# Patient Record
Sex: FEMALE | Employment: FULL TIME | URBAN - METROPOLITAN AREA
[De-identification: names, ages, dates, MRNs, and addresses within clinical notes are randomized per-mention and may not be internally consistent; named-entity substitution may affect disease eponyms.]

---

## 2017-05-19 ENCOUNTER — OFFICE VISIT (OUTPATIENT)
Dept: ONCOLOGY | Facility: CLINIC | Age: 27
End: 2017-05-19
Attending: GENETIC COUNSELOR, MS
Payer: COMMERCIAL

## 2017-05-19 ENCOUNTER — APPOINTMENT (OUTPATIENT)
Dept: LAB | Facility: CLINIC | Age: 27
End: 2017-05-19
Attending: GENETIC COUNSELOR, MS
Payer: COMMERCIAL

## 2017-05-19 DIAGNOSIS — Z80.42 FAMILY HISTORY OF PROSTATE CANCER: ICD-10-CM

## 2017-05-19 DIAGNOSIS — Z80.0 FAMILY HISTORY OF PANCREATIC CANCER: ICD-10-CM

## 2017-05-19 DIAGNOSIS — Z80.3 FAMILY HISTORY OF MALIGNANT NEOPLASM OF BREAST: Primary | ICD-10-CM

## 2017-05-19 DIAGNOSIS — Z80.3 FAMILY HISTORY OF MALIGNANT NEOPLASM OF BREAST: ICD-10-CM

## 2017-05-19 PROCEDURE — 96040 ZZH GENETIC COUNSELING, EACH 30 MINUTES: CPT | Mod: ZF | Performed by: GENETIC COUNSELOR, MS

## 2017-05-19 PROCEDURE — 36415 COLL VENOUS BLD VENIPUNCTURE: CPT

## 2017-05-19 NOTE — PATIENT INSTRUCTIONS
Breast expanded Panel  The Breast Expanded cancer panel is a genetic test which analyzes 18 genes known to be associated with an increased lifetime risk of breast and other cancers. For many of the genes, published medical guidelines exist for detection, prevention, risk reduction, and/or treatment.     Assessing Cancer Risk  Only about 5-10% of cancers are thought to be due to an inherited cancer susceptibility gene.    These families often have:    Several people with the same or related types of cancer    Cancers diagnosed at a young age (before age 50)    Individuals with more than one primary cancer    Multiple generations of the family affected with cancer    Some people may be candidates for genetic testing of more than one gene.  For these families, genetic testing using a multi-gene cancer panel may be offered.  These panels may test genes known to increase the risk for breast (and other) cancers: GILBERT, BRCA1, BRCA2, CDH1, CHEK2, PALB2, PTEN, and TP53.  The purpose of this handout is to serve as a brief summary of the high/moderate-risk breast cancer genes which have published clinical management guidelines for individuals who are found to carry a mutation.    BRCA1 and BRCA2-Hereditary Breast and Ovarian Cancer Syndrome (HBOC)  A single mutation in one of the copies of BRCA1 or BRCA2 increases the risk for breast and ovarian cancer, among others.  The risk for pancreatic cancer and melanoma may also be slightly increased in some families.  The tables below list the chance that someone with a BRCA mutation would develop cancer in his or her lifetime1,2,3,4.          Women   Men     General Population BRCA+    General Population BRCA+   Breast  12% 40-80%  Breast <1% ~7%   Ovarian  1-2% 10-40%  Prostate 16% 20%             A person s ethnic background is also important to consider, as individuals of Ashkenazi [employer] ancestry have a higher chance of having a BRCA gene mutation.  There are three BRCA  mutations that occur more frequently in this population.     Individuals who inherit two BRCA2 mutations--one from their mother and one from their father--have a condition called Fanconi Anemia.  This rare autosomal recessive condition is associated with short stature, developmental delay, bone marrow failure, and increased risk for childhood cancers.    TP53-Li-Fraumeni Syndrome (LFS)  LFS is a cancer predisposition syndrome. Individuals with LFS are at an increased risk for developing cancer at a young age. The general lifetime risk for development of cancer is 50% by age 30 and 90% by age 60.  LFS is caused by a mutation in the TP53 gene.  A single mutation in one of the copies of TP53 increases the risk for multiple cancers.     Core Cancers: Sarcomas, Breast, Brain, Lung, Leukemias/Lymphomas, Adrenocortical carcinomas  Other Cancers: Gastrointestinal, Thyroid, Skin, Genitourinary    PTEN-Cowden Syndrome  Cowden syndrome is a hereditary condition that increases the risk for breast, thyroid, endometrial, and kidney cancer.  Cowden syndrome is caused by a mutation in the PTEN gene.  A single mutation in one of the copies of PTEN causes Cowden syndrome and increases cancer risk.  The table below shows the chance that someone with a PTEN mutation would develop cancer in their lifetime5,6.  Other benign features seen in some individuals with Cowden syndrome include benign skin lesions (facial papules, keratoses, lipomas), learning disability, autism, thyroid nodules, colon polyps, and larger head size.      Lifetime Cancer Risk   Cancer Type General Population Cowden Syndrome   Breast  12% 25-50%*   Thyroid  1% 35%   Renal 1-2% 35%   Endometrial  2-3% 28%   Colon 5% 9%   Melanoma 2-3% >5%     *One recent study found breast cancer risk to be increased to 85%    CDH1-Hereditary Diffuse Gastric Cancer (HDGC)  Currently, one gene is known to cause hereditary diffuse gastric cancer: CDH1.  Individuals with HDGC are at  increased risk for diffuse gastric cancer and lobular breast cancer. Of people diagnosed with HDGC, 30-50% have a mutation in the CDH1 gene.  This suggests there are likely other genes that may cause HDGC that have not been identified yet.      Lifetime Cancer Risks    General Pop HDGC    Diffuse Gastric  <1% ~80%   Breast 12% 39-52%       Additional Genes Associated with Increased Breast Cancer Risk  PALB2  Mutations in PALB2 have been shown to increase the risk of breast cancer up to 33-58% in some families; where individuals fall within this risk range is dependent upon family history.9 PALB2 mutations have also been associated with increased risk for pancreatic cancer, although this risk has not been quantified yet.  Individuals who inherit two PALB2 mutations--one from their mother and one from their father--have a condition called Fanconi Anemia.  This rare autosomal recessive condition is associated with short stature, developmental delay, bone marrow failure, and increased risk for childhood cancers.    GILBERT  GILBERT is a moderate-risk breast cancer gene. Women who have a mutation in GILBERT can have between a 2-4 fold increased risk for breast cancer compared to the general population.10  GILBERT mutations have also been associated with increased risk for pancreatic cancer, however an estimate of this cancer risk is not well understood.11  Individuals who inherit two GILBERT mutations have a condition called ataxia-telangiectasia (AT).  This rare autosomal recessive condition affects the nervous system and immune system, and is associated with progressive cerebellar ataxia beginning in childhood.  Individuals with ataxia-telangiectasia often have a weakened immune system and have an increased risk for childhood cancers.         CHEK2   CHEK2 is a moderate-risk breast cancer gene.  Women who have a mutation in CHEK2 have around a 2-fold increased risk for breast cancer compared to the general population, and this risk may be  higher depending upon family history.12,13,14 Mutations in CHEK2 have also been shown to increase the risk of a number of other cancers, including colon and prostate, however these cancer risks are currently not well understood.      Note that the table at the end of the handout includes additional moderate breast cancer genes.         Inheritance   All of the genes reviewed above are inherited in an autosomal dominant pattern.  This means that if a parent has a mutation, each of his or her children will have a 50% chance of inheriting that same mutation.  Therefore, each child--male or female--would have a 50% chance of being at increased risk for developing cancer.        Image obtained from Genetics Home Reference, 2013     In rare cases, there can be mutations in both copies of these genes (one from each parent), leading to different autosomal recessive conditions.  Mutations in some genes can occur de jules, which means that a person s mutation occurred for the first time in them and was not inherited from a parent.  Now that they have the mutation, however, it can be passed on to future generations.     Genetic Testing  Genetic testing involves a blood test and will look for any harmful mutations within genes that are associated with increased cancer risk.  If possible, it is recommended that the person(s) who has had cancer be tested before other family members.  That person will give us the most useful information about whether or not a specific gene is associated with the cancer in the family.    Results  There are three possible results of genetic testing:    Positive--a harmful mutation was identified in one or more of the genes    Negative--no mutation was identified in any of the genes on this panel    Variant of unknown significance--a variation in one of the genes was identified, but it is unclear how this impacts cancer risk in the family      Advantages and Disadvantages  There are advantages and  disadvantages to genetic testing.  Advantages    May clarify your cancer risk    Can help you make medical decisions    May explain the cancers in your family    May give useful information to your family members (if you share your results)    Disadvantages    Possible negative emotional impact of learning about inherited cancer risk    Uncertainty in interpreting a negative test result in some situations    Possible genetic discrimination concerns (see below)    Genetic Information Nondiscrimination Act (PREETI)  PREETI is a federal law that protects individuals from health insurance or employment discrimination based on a genetic test result alone.  Although rare, there are currently no legal discrimination protections in terms of life insurance, long term care, or disability insurances.  Visit the National Human Respi Research [place] genome.gov/64783563 to learn more.    Reducing Cancer Risk  Each of the genes listed within this handout have nationally recognized cancer screening guidelines that would be recommended for individuals who test positive.  In addition to increased cancer screening, surgeries may be offered or recommended to reduce cancer risk.  Recommendations are based upon an individual s genetic test result as well as their personal and family history of cancer.    Questions to Think About Regarding Genetic Testing    What effect will the test result have on me and my relationship with my family members if I have an inherited gene mutation?  If I don t have a gene mutation?    Should I share my test results, and how will my family react to this news, which may also affect them?    Are my children ready to learn new information that may one day affect their own health?  Resources  FORCE: Facing Our Risk of Cancer Empowered facingourrisk.org   Bright Pink bebrightpink.org   Li-Fraumeni Syndrome Association lfsassociation.org   PTEN World [employer]   No stomach for cancer, Inc.  nostomachforcancer.org   Stomach cancer relief network scrnet.org   Collaborative Group of the Americas on Inherited Colorectal Cancer (CGA) cgaicc.com    Cancer Care cancercare.org   American Cancer Society (ACS) cancer.org   National Cancer [place] (NCI) cancer.gov     Cancer Risk Management Program [phone]-CANCER ([phone])  ? Shu Thakur, MS, [facility]  [phone]  ? Kateryna Caitlin, MS, [facility]  [phone]  ? Kerrie Aleman, MS, [facility]  [phone]  ? Samaria Fajardo, MS, [facility]  [phone]  ? Magaly Awan, MS, [facility]  [phone]    References  1. Tyrell Hood PDP, Sonja S, Marybeth COLLAZO, Carloz JE, Jong JL, Milly N, Pamela H, Leeanne O, Tierra A, Nadege B, Amparo P, Dimitry S, Keyur DM, Jacobo N, Reyes E, Meredith H, Yovany E, Osiel J, Risanwilson J, Aynely B, Tulinius H, Thorlacius S, Eerola H, Nevdarnelllinna H, Rita K, Leeanna OP. Average risks of breast and ovarian cancer associated with BRCA1 or BRCA2 mutations detected in case series unselected for family history: a combined analysis of 222 studies. Am J Hum Keren. 2003;72:1117-30.  2. Bibi N, Amina M, Holman G.  BRCA1 and BRCA2 Hereditary Breast and Ovarian Cancer. Gene Reviews online. 2013.  3. Dennis YC, Marlon S, Edis G, Guaman S. Breast cancer risk among male BRCA1 and BRCA2 mutation carriers. J Natl Cancer Inst. 2007;99:1811-4.  4. Landen BALL, Canelo I, Leo J, Noemy E, Chance ER, Junior F. Risk of breast cancer in male BRCA2 carriers. J Med Keren. 2010;47:710-1.  5. Sandro MH, Vane J, Ciaran J, Kasey MANRIQUE, Solomon MS, Eng C. Lifetime cancer risks in individuals with germline PTEN mutations. Clin Cancer Res. 2012;18:400-7.  6. Shane RIOJAS. Cowden Syndrome: A Critical Review of the Clinical Literature. J Keren . 2009:18:13-27.  7. National Comprehensive Cancer Network. Clinical practice guidelines in oncology, colorectal cancer screening. Available online (registration required). 2013.  8. National Cancer [place]. SEER Cancer  Stat Fact Sheets.  December 2013.  9. Deirdre JORDAN, et al. Breast-Cancer Risk in Families with Mutations in PALB2. NEJM. 2014; 371(6):497-506.  10. Gabriel CORNEJO, Mehran D, Antoinette S, Ivy P, Javed T, Lacey M, Barney B, Austin H, Angel R, Jason K, Savanah L, Landen DG, Keyur D, Ja DF, Orin MR, The Breast Cancer Susceptibility Collaboration (UK) & Amilcar N. GILBERT mutations that cause ataxia-telangiectasia are breast cancer susceptibility alleles. Nature Genetics. 2006;38:873-875  11. Eloy N , Anibal Y, Kalani J, Lawanda L, Cate GM , Carrie ML, Markus S, Pena AG, Syngal S, Ruslan ML, Mark J , Houston R, Rebecca SZ, Zoya JR, Debra VE, Altagracia M, Vogelstein B, Niecy N, Macho RH, Vinicius KW, and Petey AP. GILBERT mutations in patients with hereditary pancreatic cancer. Cancer Discover. 2012;2:41-46  12. CHEK2 Breast Cancer Case-Control Consortium. CHEK2*1100delC and susceptibility to breast cancer: A collaborative analysis involving 10,860 breast cancer cases and 9,065 controls from 10 studies. Am J Hum Keren, 74 (2004), pp. 4682-8512  13. Moris T, Kari S, Kellie K, et al. Spectrum of Mutations in BRCA1, BRCA2, CHEK2, and TP53 in Families at High Risk of Breast Cancer. JUAN,2006;295(12):0107-4679.   14. Tiffanie C, Damon FAY, Elaina A, et al. Risk of breast cancer in women with a CHEK2 mutation with and without a family history of breast cancer. JClin Oncol. 2011;29:9003-1173.    Breast expanded Panel    GENES (18) ASSOCIATED CANCER(S) ASSOCIATED CANCER SYNDROME(S)   GILBERT breast, pancreas    BARD1 breast, ovary    BRCA1 breast, ovary, pancreas, melanoma, prostate, male breast Hereditary breast /ovarian cancer syndrome (HBOC)   BRCA2 breast, ovary, pancreas, melanoma, prostate, male breast Hereditary breast /ovarian cancer syndrome (HBOC)   BRIP1 breast, ovary    CDH1 breast, stomach, colon Hereditary diffuse gastric cancer   CHEK2 breast, colon, prostate    MRE11A breast, ovary     MUTYH breast, colon MUTYH-associated polyposis (MAP)   NBN breast, ovary, prostate    NF1 breast, brain, peripheral nervous system Neurofibromatosis 1   PALB2 breast, pancreas    PTEN breast, thyroid, uterus, colon, kidney Cowden syndrome, Zytozdao-Lnnsu-Aryvrkbfa syndrome (BRRS), PTEN-related Proteus syndrome (PS), Proteus-like syndrome   RAD50 breast, ovary    RAD51C breast, ovary    RAD51D breast, ovary    STK11 breast, ovary, colon, pancreas, stomach, uterus, cervix Peutz-Jeghers syndrome   TP53 breast, bone, brain, soft tissues, adrenal cortex Li-Fraumeni syndrome           Turnaround Time  4 - 6 weeks    insurance coverage   Testing will be held until prior authorization is obtained. You will be contacted once prior authorization is completed.   About the [facility] ([facility]), [facility]  In collaborative effort with the [facility] and the [place] [facility]), the [facility] offers a full range of diagnostic testing services, with a strong focus on quality, accuracy, and timeliness.

## 2017-05-19 NOTE — LETTER
Cancer Risk Management  Program Locations    [facility]  [facility]  Mailing Address  Cancer Risk Management Program  [facility]  [address] [facility] [address]  [place] [address]    New patient appointments  [phone]  May 22, 2017    Arabella Farris  [address] [place] [facility] [address]      Dear Arabella,    It was a pleasure meeting with you at the [facility] on May 19, 2017. Here is a copy of the progress note from your recent genetic counseling visit to the Cancer Risk Management Program. If you have any additional questions, please feel free to call.    5/19/2017    Referring Provider: self-referred    Presenting Information:   I met with Arabella Farris today for genetic counseling at the Cancer Risk Management Program at the [facility] to discuss her family history of breast cancer.  She is here today to review this history, cancer screening recommendations, and available genetic testing options.    Personal History:  Arabella is a 26 year old female.  She does not have any personal history of cancer.      She had her first menstrual period at age 13, does not have biological children, and is premenopausal. Arabella has her ovaries, fallopian tubes and uterus in place, and she has had no ovarian cancer screening to date.  She reports using oral contraceptive pills for 10 years and that she has never used hormone replacement therapy.      Her most recent OB-GYN exam and Pap smear three years ago were normal.  She has annual clinical breast exams. She had one mammogram and ultrasound two years ago after self-palpating a mass, which were normal. She does not regularly do any other cancer screening at this time. Arabella reported no tobacco use and occasional alcohol use.    Family History: (Please  see scanned pedigree for detailed family history information)    One maternal first cousin is 48 and was diagnosed with breast cancer at age 40 and cervical cancer at age 41 (likely a site of metastasis). Treatment included surgery and possibly chemotherapy and radiation.    One maternal great aunt (through her grandmother) was diagnosed with breast cancer at age 45 and is currently 90; treatment is unknown.    Arabella's maternal great grandmother (through her grandmother) was diagnosed with and passed away from pancreatic cancer at age 79. Smoking and alcohol history are unknown.    One paternal aunt was diagnosed with and passed away from breast cancer at age 49; treatment is unknown.    One paternal aunt was diagnosed with breast cancer at age 65 and passed away at age 69; treatment is unknown.    Arabella's paternal grandmother was diagnosed with breast cancer at age 60 and passed away at age 67; treatment is unknown.    One paternal great aunt (through her grandmother) was diagnosed with breast cancer at an unknown age and is currently 80; treatment is unknown.    Arabella's paternal great grandfather (through her grandmother) was diagnosed with prostate cancer at age 75 and passed away at age 76.    Her maternal ethnicity is [place]. Her paternal ethnicity is [place] and [place]. There is no known Ashkenazi Congregational ancestry on either side of her family. There is no reported consanguinity.    Discussion:    Arabella's family history of breast cancer is suggestive of a hereditary cancer syndrome.    We reviewed the features of sporadic, familial, and hereditary cancers. In looking at Arabella's family history, it is possible that a cancer susceptibility gene is present as she has multiple maternal and paternal relatives diagnosed with related cancers (breast, pancreatic, and prostate) and several of these relatives were diagnosed under age 50. That being said, she also has multiple relatives that have never been  diagnosed with a cancer, including her parents and all of her maternal aunts and uncles.    We discussed the natural history and genetics of several hereditary breast cancer syndromes. A detailed handout regarding these syndromes and the information we discussed was provided to Arabella at the end of our appointment today and can be found in the after visit summary.  Topics included: inheritance pattern, cancer risks, cancer screening recommendations, and also risks, benefits and limitations of testing.    We reviewed that the most common cause of hereditary breast cancer is HBOC syndrome, which is caused by mutations in the BRCA1 and BRCA2 genes. Individuals with HBOC syndrome are at increased risk for several different cancers, including breast, ovarian, prostate, male breast cancer, melanoma, and pancreatic cancer.    Based on her personal and family history, Arabella meets current National Comprehensive Cancer Network (NCCN) criteria for genetic testing of BRCA1 and BRCA2.      We discussed that there are also other additional genes that could cause increased risk for the cancers in Arabella's family. For example, mutations in the GILBERT gene are associated with increased risk for breast and pancreatic cancer. As many of these genes present with overlapping features in a family, it would be reasonable for Arabella to consider panel genetic testing to analyze multiple genes at once.    We discussed that genetic testing for hereditary breast cancer susceptibility genes is typically most informative when it is first performed on a family member with a personal history of breast cancer. In this situation, we discussed that Arabella's paternal great aunt and maternal first cousin with breast cancer would be the best individuals to test first. Testing is available to Arabella, but with limitations. If Arabella pursues testing at this time and receives a negative result, this does not rule out the possibility of a hereditary  cancer syndrome in her and/or her family. Despite these limitations, Arabella expressed interest in proceeding with testing.  We reviewed genetic testing options for hereditary breast cancer: single gene testing (BRCA1 and BRCA2), guidelines-based high and moderate risk panel testing (Breast Actionable Panel, 11 genes) and expanded high and moderate risk panel testing (Breast Expanded Panel, 18 genes). Arabella expressed interest in learning as much information as possible from the testing. She opted for the Breast Expanded Panel.  Genetic testing is available for 18 genes associated with increased risk for breast cancer: Breast Expanded Panel  (GILBERT, BARD1, BRCA1, BRCA2, BRIP1, CDH1, CHEK2, MRE11A, MUTYH, NBN, NF1, PALB2, PTEN, RAD50, RAD51C, RAD51D, STK11 and TP53).  We discussed that some of the genes in the Breast Expanded Panel are associated with specific syndromes and published management guidelines: HBOC syndrome (BRCA1, BRCA2), Peutz-Jeghers syndrome (STK11), Hereditary Diffuse Gastric Cancer (CDH1), Cowden syndrome (PTEN), Li Fraumeni syndrome (TP53), MUTYH Associated Polyposis (MUTYH), and Neurofibromatosis type 1 (NF1).  The GILBERT, BRIP1, CHEK2, NBN, PALB2, RAD51C, and RAD51D genes are associated with moderate breast cancer risk and have published management guidelines for either breast and/or ovarian cancer risk management.    The remaining genes (BARD1, MRE11A, and RAD50) are associated with moderate breast cancer risk and may allow us to make medical recommendations when mutations are identified.    Arabella was provided with a brochure from the Cancer Risk Management Program explaining the Breast Expanded Panel testing. This information can be seen in the patient instruction tab.  Consent was obtained and genetic testing for the Breast Expanded Panel was sent to the Molecular Diagnostics Laboratory at the [facility]. Turn around time: 4-6 weeks after insurance pre-authorization is  obtained.    Medical Management: The information from genetic testing may determine additional cancer screening recommendations (i.e. mammogram and breast MRI, more frequent colonoscopies, annual dermatologic exams, etc.) as well as options for risk reducing surgeries (i.e. bilateral mastectomy, surgery to remove the ovaries and/or uterus, etc.) for Arabella and her relatives. These recommendations will be discussed in detail once genetic testing is completed.    Plan:  1) Today Arabella elected to proceed with genetic testing using the Breast Expanded Panel offered by the [facility].  2) This information should be available in 4-6 weeks after insurance pre-authorization is obtained.  3) Arabella will return to clinic to discuss the results.    Face to face time: 40 minutes    Kerrie Aleman MS, [facility]  Certified Genetic Counselor  Office: [phone]  Pager: [phone]

## 2017-05-19 NOTE — LETTER
5/19/2017       RE: Arabella Farris  [address] [facility] [place] [address]     Dear Colleague,    Thank you for referring your patient, Arabella Farris, to the [facility]. Please see a copy of my visit note below.    5/19/2017    Referring Provider: self-referred    Presenting Information:   I met with Arabella Farris today for genetic counseling at the Cancer Risk Management Program at the [facility] to discuss her family history of breast cancer.  She is here today to review this history, cancer screening recommendations, and available genetic testing options.    Personal History:  Arabella is a 26 year old female.  She does not have any personal history of cancer.      She had her first menstrual period at age 13, does not have biological children, and is premenopausal. Arabella has her ovaries, fallopian tubes and uterus in place, and she has had no ovarian cancer screening to date.  She reports using oral contraceptive pills for 10 years and that she has never used hormone replacement therapy.      Her most recent OB-GYN exam and Pap smear three years ago were normal.  She has annual clinical breast exams. She had one mammogram and ultrasound two years ago after self-palpating a mass, which were normal. She does not regularly do any other cancer screening at this time. Arabella reported no tobacco use and occasional alcohol use.    Family History: (Please see scanned pedigree for detailed family history information)    One maternal first cousin is 48 and was diagnosed with breast cancer at age 40 and cervical cancer at age 41 (likely a site of metastasis). Treatment included surgery and possibly chemotherapy and radiation.    One maternal great aunt (through her grandmother) was diagnosed with breast cancer at age 45 and is currently 90; treatment is unknown.    Arabella's maternal great grandmother (through her grandmother) was diagnosed with and passed away from pancreatic  cancer at age 79. Smoking and alcohol history are unknown.    One paternal aunt was diagnosed with and passed away from breast cancer at age 49; treatment is unknown.    One paternal aunt was diagnosed with breast cancer at age 65 and passed away at age 69; treatment is unknown.    Arabella's paternal grandmother was diagnosed with breast cancer at age 60 and passed away at age 67; treatment is unknown.    One paternal great aunt (through her grandmother) was diagnosed with breast cancer at an unknown age and is currently 80; treatment is unknown.    Arabella's paternal great grandfather (through her grandmother) was diagnosed with prostate cancer at age 75 and passed away at age 76.    Her maternal ethnicity is Occitan. Her paternal ethnicity is Occitan and [place]ian. There is no known Ashkenazi Latter day ancestry on either side of her family. There is no reported consanguinity.    Discussion:    Arabella's family history of breast cancer is suggestive of a hereditary cancer syndrome.    We reviewed the features of sporadic, familial, and hereditary cancers. In looking at Arabella's family history, it is possible that a cancer susceptibility gene is present as she has multiple maternal and paternal relatives diagnosed with related cancers (breast, pancreatic, and prostate) and several of these relatives were diagnosed under age 50. That being said, she also has multiple relatives that have never been diagnosed with a cancer, including her parents and all of her maternal aunts and uncles.    We discussed the natural history and genetics of several hereditary breast cancer syndromes. A detailed handout regarding these syndromes and the information we discussed was provided to Arabella at the end of our appointment today and can be found in the after visit summary.  Topics included: inheritance pattern, cancer risks, cancer screening recommendations, and also risks, benefits and limitations of testing.    We reviewed that the most  common cause of hereditary breast cancer is HBOC syndrome, which is caused by mutations in the BRCA1 and BRCA2 genes. Individuals with HBOC syndrome are at increased risk for several different cancers, including breast, ovarian, prostate, male breast cancer, melanoma, and pancreatic cancer.    Based on her personal and family history, Arabella meets current National Comprehensive Cancer Network (NCCN) criteria for genetic testing of BRCA1 and BRCA2.      We discussed that there are also other additional genes that could cause increased risk for the cancers in Arabella's family. For example, mutations in the GILBERT gene are associated with increased risk for breast and pancreatic cancer. As many of these genes present with overlapping features in a family, it would be reasonable for Arabella to consider panel genetic testing to analyze multiple genes at once.    We discussed that genetic testing for hereditary breast cancer susceptibility genes is typically most informative when it is first performed on a family member with a personal history of breast cancer. In this situation, we discussed that Arabella's paternal great aunt and maternal first cousin with breast cancer would be the best individuals to test first. Testing is available to Arabella, but with limitations. If Arabella pursues testing at this time and receives a negative result, this does not rule out the possibility of a hereditary cancer syndrome in her and/or her family. Despite these limitations, Arabella expressed interest in proceeding with testing.  We reviewed genetic testing options for hereditary breast cancer: single gene testing (BRCA1 and BRCA2), guidelines-based high and moderate risk panel testing (Breast Actionable Panel, 11 genes) and expanded high and moderate risk panel testing (Breast Expanded Panel, 18 genes). Arabella expressed interest in learning as much information as possible from the testing. She opted for the Breast Expanded  Panel.  Genetic testing is available for 18 genes associated with increased risk for breast cancer: Breast Expanded Panel  (GILBERT, BARD1, BRCA1, BRCA2, BRIP1, CDH1, CHEK2, MRE11A, MUTYH, NBN, NF1, PALB2, PTEN, RAD50, RAD51C, RAD51D, STK11 and TP53).  We discussed that some of the genes in the Breast Expanded Panel are associated with specific syndromes and published management guidelines: HBOC syndrome (BRCA1, BRCA2), Peutz-Jeghers syndrome (STK11), Hereditary Diffuse Gastric Cancer (CDH1), Cowden syndrome (PTEN), Li Fraumeni syndrome (TP53), MUTYH Associated Polyposis (MUTYH), and Neurofibromatosis type 1 (NF1).  The GILBERT, BRIP1, CHEK2, NBN, PALB2, RAD51C, and RAD51D genes are associated with moderate breast cancer risk and have published management guidelines for either breast and/or ovarian cancer risk management.    The remaining genes (BARD1, MRE11A, and RAD50) are associated with moderate breast cancer risk and may allow us to make medical recommendations when mutations are identified.    Arabella was provided with a brochure from the Cancer Risk Management Program explaining the Breast Expanded Panel testing. This information can be seen in the patient instruction tab.  Consent was obtained and genetic testing for the Breast Expanded Panel was sent to the Molecular Diagnostics Laboratory at the [facility]. Turn around time: 4-6 weeks after insurance pre-authorization is obtained.    Medical Management: The information from genetic testing may determine additional cancer screening recommendations (i.e. mammogram and breast MRI, more frequent colonoscopies, annual dermatologic exams, etc.) as well as options for risk reducing surgeries (i.e. bilateral mastectomy, surgery to remove the ovaries and/or uterus, etc.) for Arabella and her relatives. These recommendations will be discussed in detail once genetic testing is completed.    Plan:  1) Today Arabella elected to proceed with genetic testing  using the Breast Expanded Panel offered by the [facility] Diagnostics [facility].  2) This information should be available in 4-6 weeks after insurance pre-authorization is obtained.  3) Arabella will return to clinic to discuss the results.    Face to face time: 40 minutes    Kerrie Aleman MS, [facility]  Certified Genetic Counselor  Office: [phone]  Pager: [phone]

## 2017-05-19 NOTE — NURSING NOTE
Chief Complaint   Patient presents with     Lab Only     patient here for peripheral labs for genetic testing   labs drawn from right arm

## 2017-05-19 NOTE — PROGRESS NOTES
5/19/2017    Referring Provider: self-referred    Presenting Information:   I met with Arabella Farris today for genetic counseling at the Cancer Risk Management Program at the [facility] to discuss her family history of breast cancer.  She is here today to review this history, cancer screening recommendations, and available genetic testing options.    Personal History:  Arabella is a 26 year old female.  She does not have any personal history of cancer.      She had her first menstrual period at age 13, does not have biological children, and is premenopausal. Arabella has her ovaries, fallopian tubes and uterus in place, and she has had no ovarian cancer screening to date.  She reports using oral contraceptive pills for 10 years and that she has never used hormone replacement therapy.      Her most recent OB-GYN exam and Pap smear three years ago were normal.  She has annual clinical breast exams. She had one mammogram and ultrasound two years ago after self-palpating a mass, which were normal. She does not regularly do any other cancer screening at this time. Arabella reported no tobacco use and occasional alcohol use.    Family History: (Please see scanned pedigree for detailed family history information)    One maternal first cousin is 48 and was diagnosed with breast cancer at age 40 and cervical cancer at age 41 (likely a site of metastasis). Treatment included surgery and possibly chemotherapy and radiation.    One maternal great aunt (through her grandmother) was diagnosed with breast cancer at age 45 and is currently 90; treatment is unknown.    Arabella's maternal great grandmother (through her grandmother) was diagnosed with and passed away from pancreatic cancer at age 79. Smoking and alcohol history are unknown.    One paternal aunt was diagnosed with and passed away from breast cancer at age 49; treatment is unknown.    One paternal aunt was diagnosed with breast cancer at age 65 and passed away  at age 69; treatment is unknown.    Arabella's paternal grandmother was diagnosed with breast cancer at age 60 and passed away at age 67; treatment is unknown.    One paternal great aunt (through her grandmother) was diagnosed with breast cancer at an unknown age and is currently 80; treatment is unknown.    Arabella's paternal great grandfather (through her grandmother) was diagnosed with prostate cancer at age 75 and passed away at age 76.    Her maternal ethnicity is [place]. Her paternal ethnicity is [place] and Romansh. There is no known Ashkenazi Jew ancestry on either side of her family. There is no reported consanguinity.    Discussion:    Arabella's family history of breast cancer is suggestive of a hereditary cancer syndrome.    We reviewed the features of sporadic, familial, and hereditary cancers. In looking at Arabella's family history, it is possible that a cancer susceptibility gene is present as she has multiple maternal and paternal relatives diagnosed with related cancers (breast, pancreatic, and prostate) and several of these relatives were diagnosed under age 50. That being said, she also has multiple relatives that have never been diagnosed with a cancer, including her parents and all of her maternal aunts and uncles.    We discussed the natural history and genetics of several hereditary breast cancer syndromes. A detailed handout regarding these syndromes and the information we discussed was provided to Arabella at the end of our appointment today and can be found in the after visit summary.  Topics included: inheritance pattern, cancer risks, cancer screening recommendations, and also risks, benefits and limitations of testing.    We reviewed that the most common cause of hereditary breast cancer is HBOC syndrome, which is caused by mutations in the BRCA1 and BRCA2 genes. Individuals with HBOC syndrome are at increased risk for several different cancers, including breast, ovarian, prostate, male  breast cancer, melanoma, and pancreatic cancer.    Based on her personal and family history, Arabella meets current National Comprehensive Cancer Network (NCCN) criteria for genetic testing of BRCA1 and BRCA2.      We discussed that there are also other additional genes that could cause increased risk for the cancers in Arabella's family. For example, mutations in the GILBERT gene are associated with increased risk for breast and pancreatic cancer. As many of these genes present with overlapping features in a family, it would be reasonable for Arabella to consider panel genetic testing to analyze multiple genes at once.    We discussed that genetic testing for hereditary breast cancer susceptibility genes is typically most informative when it is first performed on a family member with a personal history of breast cancer. In this situation, we discussed that Arabella's paternal great aunt and maternal first cousin with breast cancer would be the best individuals to test first. Testing is available to Arabella, but with limitations. If Arabella pursues testing at this time and receives a negative result, this does not rule out the possibility of a hereditary cancer syndrome in her and/or her family. Despite these limitations, Arabella expressed interest in proceeding with testing.  We reviewed genetic testing options for hereditary breast cancer: single gene testing (BRCA1 and BRCA2), guidelines-based high and moderate risk panel testing (Breast Actionable Panel, 11 genes) and expanded high and moderate risk panel testing (Breast Expanded Panel, 18 genes). Arabella expressed interest in learning as much information as possible from the testing. She opted for the Breast Expanded Panel.  Genetic testing is available for 18 genes associated with increased risk for breast cancer: Breast Expanded Panel  (GILBERT, BARD1, BRCA1, BRCA2, BRIP1, CDH1, CHEK2, MRE11A, MUTYH, NBN, NF1, PALB2, PTEN, RAD50, RAD51C, RAD51D, STK11 and TP53).  We  discussed that some of the genes in the Breast Expanded Panel are associated with specific syndromes and published management guidelines: HBOC syndrome (BRCA1, BRCA2), Peutz-Jeghers syndrome (STK11), Hereditary Diffuse Gastric Cancer (CDH1), Cowden syndrome (PTEN), Li Fraumeni syndrome (TP53), MUTYH Associated Polyposis (MUTYH), and Neurofibromatosis type 1 (NF1).  The GILBERT, BRIP1, CHEK2, NBN, PALB2, RAD51C, and RAD51D genes are associated with moderate breast cancer risk and have published management guidelines for either breast and/or ovarian cancer risk management.    The remaining genes (BARD1, MRE11A, and RAD50) are associated with moderate breast cancer risk and may allow us to make medical recommendations when mutations are identified.    Arabella was provided with a brochure from the Cancer Risk Management Program explaining the Breast Expanded Panel testing. This information can be seen in the patient instruction tab.  Consent was obtained and genetic testing for the Breast Expanded Panel was sent to the Molecular Diagnostics Laboratory at the [facility]. Turn around time: 4-6 weeks after insurance pre-authorization is obtained.    Medical Management: The information from genetic testing may determine additional cancer screening recommendations (i.e. mammogram and breast MRI, more frequent colonoscopies, annual dermatologic exams, etc.) as well as options for risk reducing surgeries (i.e. bilateral mastectomy, surgery to remove the ovaries and/or uterus, etc.) for Arabella and her relatives. These recommendations will be discussed in detail once genetic testing is completed.    Plan:  1) Today Arabella elected to proceed with genetic testing using the Breast Expanded Panel offered by the [facility].  2) This information should be available in 4-6 weeks after insurance pre-authorization is obtained.  3) Arabella will return to clinic to  discuss the results.    Face to face time: 40 minutes    Kerrie Aleman MS, [facility]  Certified Genetic Counselor  Office: [phone]  Pager: [phone]

## 2017-05-25 LAB — COPATH REPORT: NORMAL

## 2017-06-30 ENCOUNTER — APPOINTMENT (OUTPATIENT)
Dept: LAB | Facility: CLINIC | Age: 27
End: 2017-06-30
Attending: COLON & RECTAL SURGERY
Payer: COMMERCIAL

## 2017-06-30 ENCOUNTER — TELEPHONE (OUTPATIENT)
Dept: ONCOLOGY | Facility: CLINIC | Age: 27
End: 2017-06-30

## 2017-06-30 DIAGNOSIS — Z80.42 FAMILY HISTORY OF PROSTATE CANCER: ICD-10-CM

## 2017-06-30 DIAGNOSIS — Z80.0 FAMILY HISTORY OF PANCREATIC CANCER: ICD-10-CM

## 2017-06-30 DIAGNOSIS — Z80.3 FAMILY HISTORY OF MALIGNANT NEOPLASM OF BREAST: Primary | ICD-10-CM

## 2017-06-30 PROCEDURE — 81405 MOPATH PROCEDURE LEVEL 6: CPT

## 2017-06-30 PROCEDURE — 81479 UNLISTED MOLECULAR PATHOLOGY: CPT | Mod: 91

## 2017-06-30 PROCEDURE — 81211 ZZHCL BRCA1/BRCA2 HERED GENE DEL/DUP: CPT

## 2017-06-30 PROCEDURE — 81408 MOPATH PROCEDURE LEVEL 9: CPT

## 2017-06-30 PROCEDURE — 81321 PTEN GENE FULL SEQUENCE: CPT

## 2017-06-30 PROCEDURE — 81479 UNLISTED MOLECULAR PATHOLOGY: CPT

## 2017-06-30 PROCEDURE — 81406 MOPATH PROCEDURE LEVEL 7: CPT | Mod: 91

## 2017-06-30 PROCEDURE — 81213 ZZHCL BRCA1/BRCA2 DEL/DUP UNCOMM: CPT

## 2017-06-30 PROCEDURE — 81406 MOPATH PROCEDURE LEVEL 7: CPT

## 2017-06-30 PROCEDURE — 81323 PTEN GENE DUP/DELET VARIANT: CPT

## 2017-06-30 NOTE — TELEPHONE ENCOUNTER
Notified Arabella that we have received prior authorization approval for genetic testing valid from 5/19/17-11/19/17. Authorization number is [id number]. Per the insurance company, Arabella has no deductible and is covered 100% for testing. Arabella expressed understanding and stated that she wants to proceed with testing. We will be in touch again when results are complete. Arabella had no further questions.    BLAIRE Stewart    Division of Genetics   [phone]

## 2017-07-13 LAB — COPATH REPORT: NORMAL

## 2017-08-07 ENCOUNTER — OFFICE VISIT (OUTPATIENT)
Dept: ONCOLOGY | Facility: CLINIC | Age: 27
End: 2017-08-07
Attending: GENETIC COUNSELOR, MS
Payer: COMMERCIAL

## 2017-08-07 DIAGNOSIS — Z80.3 FAMILY HISTORY OF MALIGNANT NEOPLASM OF BREAST: Primary | ICD-10-CM

## 2017-08-07 PROCEDURE — 40000072 ZZH STATISTIC GENETIC COUNSELING, < 16 MIN: Mod: ZF | Performed by: GENETIC COUNSELOR, MS

## 2017-08-07 NOTE — LETTER
"    Cancer Risk Management  Program Locations    [facility]  [facility]  Mailing Address  Cancer Risk Management Program  [facility]  [address]  [facility] [address]  [place] [address]    New patient appointments  [phone]  August 9, 2017    Arabella Farris  [address] [address]      Dear Arabella,    It was a pleasure meeting with you again at the [facility] on August 7, 2017. Here is a copy of the progress note from your recent genetic counseling visit to the Cancer Risk Management Program. If you have any additional questions, please feel free to call.    8/7/2017    Referring Provider: self-referred    Presenting Information:  Arabella Farris returned to the Cancer Risk Management Program at the [facility] to discuss her genetic testing results. Her blood was drawn on 5/19/2017. The Breast Expanded Panel was ordered from the [facility]. This testing was done because of her family history of breast cancer.    Genetic Testing Result: NEGATIVE  Arabella is negative for mutations in the GILBERT, BARD1, BRCA1, BRCA2, BRIP1, CDH1, CHEK2, MRE11A, MUTYH, NBN, NF1, PALB2, PTEN, RAD50, RAD51C, RAD51D, STK11, and TP53 genes by sequencing and deletion/duplication analysis. No mutations were found in any of the 18 genes analyzed.    She does not have an identifiable mutation associated with Hereditary Breast and Ovarian Cancer syndrome (BRCA1, BRCA2), Li Fraumeni syndrome (TP53), Hereditary Diffuse Gastric Cancer (CDH1), Cowden syndrome (PTEN), Peutz-Jeghers syndrome (STK11), Neurofibromatosis type 1 (NF1) or MUTYH Associated Polyposis (MAP).      A copy of the test report can be found in the Laboratory tab, dated 6/30/2017, and named \"Next generation " "sequencing\".     Interpretation:  We discussed several different interpretations of this negative test result.    1. One explanation may be that there is a different gene or combination of genes and environment that are associated with the cancers in Arabella's relatives. We reviewed that additional genetic testing may be available in the future that can identify a genetic/hereditary explanation for her family history. As such, she is encouraged to contact me annually to review any new genetic testing options that may be appropriate for her.    2. It is possible that another relative, such as her paternal aunts with breast cancer and/or maternal great aunt with breast cancer, did have a mutation in one of these 18 genes and Arabella did not inherit it. If that is the case, Arabella would not be expected to carry the same increased risk for cancer that her relatives with the mutation would be expected to carry.  3. There is also a small possibility that there is a mutation in one of these genes, and we could not find it with our current testing methods.       Screening:  Based on this negative test result, it is important for Arabella and her relatives to refer back to the family history for appropriate cancer screening.      Based on her personal and family history, Arabella has a 22.6% lifetime risk of developing breast cancer based on the MARINA 8 model. As such, Arabella meets current National Comprehensive Cancer Network (NCCN) guidelines for high risk breast screening. This includes annual breast MRI in addition to annual mammogram beginning at age 39. In addition, Arabella should be receiving clinical breast exams by her physician. We discussed that Arabella could participate in our Cancer Risk Management Program in which our clinical nurse specialist provides an individual screening plan and assists with medical management. She could also consider waiting until she is nearing age 39 to readdress this screening with her " medical providers. Arabella expressed interest in meeting with Tete to discuss this screening, as well as self breast exams and preventative lifestyle behaviors. A referral was made to see GABE Gaviria, for this service.    Other population cancer screening options, such as those recommended by the American Cancer Society and NCCN, are also appropriate for Arabella and her family. These screening recommendations may change if there are changes to Arabella's personal and/or family history. Final screening recommendations should be made by each individual's managing physician.      Inheritance:  We reviewed the autosomal dominant inheritance of mutations in these 18 genes. We discussed that if Arabella chooses to have children, she cannot pass on an identifiable mutation in these genes to her future children based on this test result. Mutations in these gene do not skip generations.      Additional Testing Considerations:  Although Arabella's genetic testing result was negative, other relatives may still carry a gene mutation associated with hereditary breast cancer. Genetic counseling is recommended for her paternal great aunt with breast cancer, maternal first cousin with breast cancer, and her maternal great aunt with breast cancer to discuss their genetic testing options. Arabella's father, mother, and brothers could also consider meeting with a genetic counselor. If these relatives do pursue genetic testing, she is encouraged to contact me so we may discuss the impact of their test results on Arabella.    Summary:  We do not have an explanation for Arabella's family history of breast cancer. Because of that, it is important that she continue with cancer screening based on her personal and family history as discussed above.    Genetic testing is rapidly advancing, and new cancer susceptibility genes will most likely be identified in the future. Therefore, I encouraged Arabella to contact me annually or if  there are changes in her personal or family history. This may change how we assess her cancer risk, screening, and the testing we would offer.    Plan:  1. I provided Arabella with a copy of her test results today, and a handout summarizing negative genetic test results (see after visit summary).  2. She plans to follow-up with her medical providers.  3. A referral will be placed for Arabella to meet with MARJAN Gaviria to discuss high risk breast screening.  4. She should contact me annually, or sooner if her family history changes.    If Arabella has any further questions, I encouraged her to contact me at [phone].    Time spent face to face: 15 minutes.    Kerrie Aleman MS, [facility]  Certified Genetic Counselor  Office: [phone]  Pager: [phone]

## 2017-08-07 NOTE — PROGRESS NOTES
"8/7/2017    Referring Provider: self-referred    Presenting Information:  Arabella Farris returned to the Cancer Risk Management Program at the [facility] to discuss her genetic testing results. Her blood was drawn on 5/19/2017. The Breast Expanded Panel was ordered from the [facility]. This testing was done because of her family history of breast cancer.    Genetic Testing Result: NEGATIVE  Arabella is negative for mutations in the GILBERT, BARD1, BRCA1, BRCA2, BRIP1, CDH1, CHEK2, MRE11A, MUTYH, NBN, NF1, PALB2, PTEN, RAD50, RAD51C, RAD51D, STK11, and TP53 genes by sequencing and deletion/duplication analysis. No mutations were found in any of the 18 genes analyzed.    She does not have an identifiable mutation associated with Hereditary Breast and Ovarian Cancer syndrome (BRCA1, BRCA2), Li Fraumeni syndrome (TP53), Hereditary Diffuse Gastric Cancer (CDH1), Cowden syndrome (PTEN), Peutz-Jeghers syndrome (STK11), Neurofibromatosis type 1 (NF1) or MUTYH Associated Polyposis (MAP).      A copy of the test report can be found in the Laboratory tab, dated 6/30/2017, and named \"Next generation sequencing\".     Interpretation:  We discussed several different interpretations of this negative test result.    1. One explanation may be that there is a different gene or combination of genes and environment that are associated with the cancers in Arabella's relatives. We reviewed that additional genetic testing may be available in the future that can identify a genetic/hereditary explanation for her family history. As such, she is encouraged to contact me annually to review any new genetic testing options that may be appropriate for her.    2. It is possible that another relative, such as her paternal aunts with breast cancer and/or maternal great aunt with breast cancer, did have a mutation in one of these 18 genes and Arabella did not inherit it. If that is the case, " Arabella would not be expected to carry the same increased risk for cancer that her relatives with the mutation would be expected to carry.  3. There is also a small possibility that there is a mutation in one of these genes, and we could not find it with our current testing methods.       Screening:  Based on this negative test result, it is important for Arabella and her relatives to refer back to the family history for appropriate cancer screening.      Based on her personal and family history, Arabella has a 22.6% lifetime risk of developing breast cancer based on the MARINA 8 model. As such, Arabella meets current National Comprehensive Cancer Network (NCCN) guidelines for high risk breast screening. This includes annual breast MRI in addition to annual mammogram beginning at age 39. In addition, Arabella should be receiving clinical breast exams by her physician. We discussed that Arabella could participate in our Cancer Risk Management Program in which our clinical nurse specialist provides an individual screening plan and assists with medical management. She could also consider waiting until she is nearing age 39 to readdress this screening with her medical providers. Arabella expressed interest in meeting with Tete to discuss this screening, as well as self breast exams and preventative lifestyle behaviors. A referral was made to see GABE Gaviria CNS, for this service.    Other population cancer screening options, such as those recommended by the American Cancer Society and NCCN, are also appropriate for Arabella and her family. These screening recommendations may change if there are changes to Arabella's personal and/or family history. Final screening recommendations should be made by each individual's managing physician.      Inheritance:  We reviewed the autosomal dominant inheritance of mutations in these 18 genes. We discussed that if Arabella chooses to have children, she cannot pass on an identifiable  mutation in these genes to her future children based on this test result. Mutations in these gene do not skip generations.      Additional Testing Considerations:  Although Arabella's genetic testing result was negative, other relatives may still carry a gene mutation associated with hereditary breast cancer. Genetic counseling is recommended for her paternal great aunt with breast cancer, maternal first cousin with breast cancer, and her maternal great aunt with breast cancer to discuss their genetic testing options. Arabella's father, mother, and brothers could also consider meeting with a genetic counselor. If these relatives do pursue genetic testing, she is encouraged to contact me so we may discuss the impact of their test results on Arabella.    Summary:  We do not have an explanation for Arabella's family history of breast cancer. Because of that, it is important that she continue with cancer screening based on her personal and family history as discussed above.    Genetic testing is rapidly advancing, and new cancer susceptibility genes will most likely be identified in the future. Therefore, I encouraged Arabella to contact me annually or if there are changes in her personal or family history. This may change how we assess her cancer risk, screening, and the testing we would offer.    Plan:  1. I provided Arabella with a copy of her test results today, and a handout summarizing negative genetic test results (see after visit summary).  2. She plans to follow-up with her medical providers.  3. A referral will be placed for Arabella to meet with MARJAN Gaviria to discuss high risk breast screening.  4. She should contact me annually, or sooner if her family history changes.    If Arabella has any further questions, I encouraged her to contact me at [phone].    Time spent face to face: 15 minutes.    Kerrie Aleman MS, [facility]  Certified Genetic Counselor  Office: [phone]  Pager: [phone]

## 2017-08-07 NOTE — PATIENT INSTRUCTIONS
Negative Genetic Test Result    Genetic Testing  You had a blood test that looked at the genetic information in one or more genes associated with increased cancer risk.  The testing looked for any harmful changes that would stop this particular gene from working like it should. If an individual does not have any harmful changes or variants of unknown significance found from their blood test, their genetic test result is reported as negative.       Results  The genetic test did not identify any pathogenic (harmful) changes in the genes that were tested. There are several possible explanations for a negative test result. Without knowing the gene mutation in your family, the cause of the cancer in you or your relatives is still unknown. Your genetic counselor can help interpret the result for you and your relatives. In this case, there are several reasons that may explain the negative test result:    There may be a gene mutation in the family that you did not inherit.     You may have a gene mutation in a different gene that was not included in the test, or has not yet been discovered.     The cancers in you or your family may be due to a combination of genetic factors and environment (multifactorial/familial).    The cancers in you or your family may be sporadic/random cancers.    There is very small chance that a mutation was not found by current testing methods.  As testing technology evolves over time, it may still be possible to identify a mutation in a gene that was not found on this test.    It is important to note which genes were included in your test. A list of these genes can be found on your test result.    Screening Recommendations  Due to this negative test result, cancer screening recommendations should be based on your personal and family history. This may include increased cancer screening for you and/or your family members. Your genetic counselor and health care provider can help make  appropriate recommendations.      Please call us if you have any questions or concerns.     Cancer Risk Management Program  [phone] ([phone])  ? Shu Thakur, MS, [facility]  [phone]  ? Kateryna Tucker, MS, [facility]  [phone]  ? Kerrie Aleman, MS, [facility]  [phone]  ? Samaria Fajardo, MS, [facility]  [phone]  ? Magaly Awan, MS, [facility]  [phone]

## 2017-08-07 NOTE — MR AVS SNAPSHOT
After Visit Summary   8/7/2017    Arabella Farris    MRN: [id number]           Patient Information     Date Of Birth          1990        Visit Information        Provider Department      8/7/2017 10:15 AM Kerrie Aleman GC;  2 [address] CONSULT [facility]        Care Instructions            Negative Genetic Test Result    Genetic Testing  You had a blood test that looked at the genetic information in one or more genes associated with increased cancer risk.  The testing looked for any harmful changes that would stop this particular gene from working like it should. If an individual does not have any harmful changes or variants of unknown significance found from their blood test, their genetic test result is reported as negative.       Results  The genetic test did not identify any pathogenic (harmful) changes in the genes that were tested. There are several possible explanations for a negative test result. Without knowing the gene mutation in your family, the cause of the cancer in you or your relatives is still unknown. Your genetic counselor can help interpret the result for you and your relatives. In this case, there are several reasons that may explain the negative test result:    There may be a gene mutation in the family that you did not inherit.     You may have a gene mutation in a different gene that was not included in the test, or has not yet been discovered.     The cancers in you or your family may be due to a combination of genetic factors and environment (multifactorial/familial).    The cancers in you or your family may be sporadic/random cancers.    There is very small chance that a mutation was not found by current testing methods.  As testing technology evolves over time, it may still be possible to identify a mutation in a gene that was not found on this test.    It is important to note which genes were included in your test. A list of these genes can be  found on your test result.    Screening Recommendations  Due to this negative test result, cancer screening recommendations should be based on your personal and family history. This may include increased cancer screening for you and/or your family members. Your genetic counselor and health care provider can help make appropriate recommendations.      Please call us if you have any questions or concerns.     Cancer Risk Management Program  [phone] ([phone])  ? Shu Thakur, MS, [facility]  [phone]  ? Kateryna Caitlin, MS, [facility]  [phone]  ? Kerrie Aleman, MS, [facility]  [phone]  ? Samaria Fajardo, MS, [facility]  [phone]  ? Magaly Awan, MS, [facility]  [phone]          Follow-ups after your visit        Your next 10 appointments already scheduled     Aug 07, 2017 10:15 AM CDT   (Arrive by 10:00 AM)   RETURN WITH ROOM with Kerrie Aleman GC,  2 116 CONSULT [facility] ([facility])    [address] [place] [id number] [phone]              Who to contact     If you have questions or need follow up information about today's clinic visit or your schedule please contact [facility] directly at [phone].  Normal or non-critical lab and imaging results will be communicated to you by DocTreehart, letter or phone within 4 business days after the clinic has received the results. If you do not hear from us within 7 days, please contact the clinic through DocTreehart or phone. If you have a critical or abnormal lab result, we will notify you by phone as soon as possible.  Submit refill requests through ArcMail or call your pharmacy and they will forward the refill request to us. Please allow 3 business days for your refill to be completed.          Additional Information About Your Visit        ArcMail Information     ArcMail gives you secure access to your electronic health record. If you see a primary care provider, you can also  send messages to your care team and make appointments. If you have questions, please call your primary care clinic.  If you do not have a primary care provider, please call [phone] and they will assist you.        Care EveryWhere ID     This is your Care EveryWhere ID. This could be used by other organizations to access your [place] medical records  [id number]         Blood Pressure from Last 3 Encounters:   03/23/16 130/86   01/20/16 130/73    Weight from Last 3 Encounters:   03/23/16 61.9 kg (136 lb 8 oz)   01/20/16 57.6 kg (127 lb)              Today, you had the following     No orders found for display       Primary Care Provider    No Pcp Confirmed       No address on file        Equal Access to Services     JULIO CESAR MARINELLI : Jossy Gambino, anita tejada, lillian montes de oca, rudy fuller . So [facility] [phone].    ATENCIÓN: Si habla español, tiene a fernandez disposición servicios gratuitos de asistencia lingüística. Llame al [phone].    We comply with applicable federal civil rights laws and [place] laws. We do not discriminate on the basis of race, color, national origin, age, disability sex, sexual orientation or gender identity.            Thank you!     Thank you for choosing [facility]  for your care. Our goal is always to provide you with excellent care. Hearing back from our patients is one way we can continue to improve our services. Please take a few minutes to complete the written survey that you may receive in the mail after your visit with us. Thank you!             Your Updated Medication List - Protect others around you: Learn how to safely use, store and throw away your medicines at www.disposemymeds.org.          This list is accurate as of: 8/7/17 10:02 AM.  Always use your most recent med list.                   Brand Name Dispense Instructions for use Diagnosis    UNKNOWN TO PATIENT      Birth Control

## 2017-08-07 NOTE — LETTER
"8/7/2017       RE: Arabella Farris  [address] [place] [address]     Dear Colleague,    Thank you for referring your patient, Arabella Farris, to the [facility]. Please see a copy of my visit note below.    8/7/2017    Referring Provider: self-referred    Presenting Information:  Arabella Farris returned to the Cancer Risk Management Program at the [facility] to discuss her genetic testing results. Her blood was drawn on 5/19/2017. The Breast Expanded Panel was ordered from the [facility]. This testing was done because of her family history of breast cancer.    Genetic Testing Result: NEGATIVE  Arabella is negative for mutations in the GILBERT, BARD1, BRCA1, BRCA2, BRIP1, CDH1, CHEK2, MRE11A, MUTYH, NBN, NF1, PALB2, PTEN, RAD50, RAD51C, RAD51D, STK11, and TP53 genes by sequencing and deletion/duplication analysis. No mutations were found in any of the 18 genes analyzed.    She does not have an identifiable mutation associated with Hereditary Breast and Ovarian Cancer syndrome (BRCA1, BRCA2), Li Fraumeni syndrome (TP53), Hereditary Diffuse Gastric Cancer (CDH1), Cowden syndrome (PTEN), Peutz-Jeghers syndrome (STK11), Neurofibromatosis type 1 (NF1) or MUTYH Associated Polyposis (MAP).      A copy of the test report can be found in the Laboratory tab, dated 6/30/2017, and named \"Next generation sequencing\".     Interpretation:  We discussed several different interpretations of this negative test result.    1. One explanation may be that there is a different gene or combination of genes and environment that are associated with the cancers in Arabella's relatives. We reviewed that additional genetic testing may be available in the future that can identify a genetic/hereditary explanation for her family history. As such, she is encouraged to contact me annually to review any new genetic testing options that may be appropriate " for her.    2. It is possible that another relative, such as her paternal aunts with breast cancer and/or maternal great aunt with breast cancer, did have a mutation in one of these 18 genes and Arabella did not inherit it. If that is the case, Arabella would not be expected to carry the same increased risk for cancer that her relatives with the mutation would be expected to carry.  3. There is also a small possibility that there is a mutation in one of these genes, and we could not find it with our current testing methods.       Screening:  Based on this negative test result, it is important for Arabella and her relatives to refer back to the family history for appropriate cancer screening.      Based on her personal and family history, Arabella has a 22.6% lifetime risk of developing breast cancer based on the MARINA 8 model. As such, Arabella meets current National Comprehensive Cancer Network (NCCN) guidelines for high risk breast screening. This includes annual breast MRI in addition to annual mammogram beginning at age 39. In addition, Arabella should be receiving clinical breast exams by her physician. We discussed that Arabella could participate in our Cancer Risk Management Program in which our clinical nurse specialist provides an individual screening plan and assists with medical management. She could also consider waiting until she is nearing age 39 to readdress this screening with her medical providers. Arabella expressed interest in meeting with Tete to discuss this screening, as well as self breast exams and preventative lifestyle behaviors. A referral was made to see GABE Gaviria CNS, for this service.    Other population cancer screening options, such as those recommended by the American Cancer Society and NCCN, are also appropriate for Arabella and her family. These screening recommendations may change if there are changes to Arabella's personal and/or family history. Final screening recommendations  should be made by each individual's managing physician.      Inheritance:  We reviewed the autosomal dominant inheritance of mutations in these 18 genes. We discussed that if Arabella chooses to have children, she cannot pass on an identifiable mutation in these genes to her future children based on this test result. Mutations in these gene do not skip generations.      Additional Testing Considerations:  Although Arabella's genetic testing result was negative, other relatives may still carry a gene mutation associated with hereditary breast cancer. Genetic counseling is recommended for her paternal great aunt with breast cancer, maternal first cousin with breast cancer, and her maternal great aunt with breast cancer to discuss their genetic testing options. Arabella's father, mother, and brothers could also consider meeting with a genetic counselor. If these relatives do pursue genetic testing, she is encouraged to contact me so we may discuss the impact of their test results on Arabella.    Summary:  We do not have an explanation for Arabella's family history of breast cancer. Because of that, it is important that she continue with cancer screening based on her personal and family history as discussed above.    Genetic testing is rapidly advancing, and new cancer susceptibility genes will most likely be identified in the future. Therefore, I encouraged Arabella to contact me annually or if there are changes in her personal or family history. This may change how we assess her cancer risk, screening, and the testing we would offer.    Plan:  1. I provided Arabella with a copy of her test results today, and a handout summarizing negative genetic test results (see after visit summary).  2. She plans to follow-up with her medical providers.  3. A referral will be placed for Arabella to meet with MARJAN Gaviria to discuss high risk breast screening.  4. She should contact me annually, or sooner if her family history  changes.    If Arabella has any further questions, I encouraged her to contact me at [phone].    Time spent face to face: 15 minutes.    Kerrie Aleman MS, [facility]  Certified Genetic Counselor  Office: [phone]  Pager: [phone]

## 2017-11-10 ENCOUNTER — ONCOLOGY VISIT (OUTPATIENT)
Dept: ONCOLOGY | Facility: CLINIC | Age: 27
End: 2017-11-10
Attending: CLINICAL NURSE SPECIALIST
Payer: COMMERCIAL

## 2017-11-10 VITALS
RESPIRATION RATE: 16 BRPM | TEMPERATURE: 97.7 F | DIASTOLIC BLOOD PRESSURE: 81 MMHG | HEART RATE: 65 BPM | BODY MASS INDEX: 19.47 KG/M2 | SYSTOLIC BLOOD PRESSURE: 127 MMHG | WEIGHT: 131.84 LBS | OXYGEN SATURATION: 97 %

## 2017-11-10 DIAGNOSIS — Z80.3 FAMILY HISTORY OF MALIGNANT NEOPLASM OF BREAST: ICD-10-CM

## 2017-11-10 PROCEDURE — 99212 OFFICE O/P EST SF 10 MIN: CPT | Mod: ZF

## 2017-11-10 PROCEDURE — 99204 OFFICE O/P NEW MOD 45 MIN: CPT | Mod: ZP | Performed by: CLINICAL NURSE SPECIALIST

## 2017-11-10 RX ORDER — MULTIPLE VITAMINS W/ MINERALS TAB 9MG-400MCG
1 TAB ORAL DAILY
COMMUNITY

## 2017-11-10 ASSESSMENT — PAIN SCALES - GENERAL: PAINLEVEL: NO PAIN (0)

## 2017-11-10 NOTE — LETTER
Cancer Risk Management  Program Locations    [facility]  [facility]  [facility]  Mailing Address  Cancer Risk Management Program  [facility]  [address]  [place] [address]    New patient appointments  [phone]  November 10, 2017    Arabella Farris  [address]  [facility] [address]      Dear Arabella,    It was a pleasure meeting with you today.  Below is a copy of my note from our visit, outlining your surveillance plan.      I look forward to seeing you in the future to coordinate your care and reduce your health risks. Please feel free to contact me if you have any questions or concerns.      Oncology Risk Management Consultation:  Date on this visit: 11/10/2017    Arabella Farris is referred by Kerrie Aleman, Certified Genetic Counselor,  for an oncology risk management consultation. She requires evaluation for her risk of cancer secondary to having a family history of breast cancer in a maternal first cousin at 40, maternal great aunt at 45, paternal aunt at 49, paternal grandmother at 60 and several other relatives.. She is considered to at high risk for breast cancer and has a 22.6% lifetime risk for breast cancer by the MARINA model.     Primary Physician: No primary care provider on file.     History Of Present Illness:  Ms. Farris is a very pleasant, healthy 27 year old female who presents with family history of breast cancer.    Genetic testin2017 - NEGATIVE for genetic mutations in 18 genes associated with breast cancer including GILBERT, BARD1, BRCA1, BRCA2, BRIP1, CDH1, CHEK2, MRE11A, MUTYH, NBN, NF1, PALB2, PTEN, RAD50, RAD51C, RAD51D, STK11, and TP53 genes by sequencing and deletion/duplication analysis. The testing was done using a Breast Expanded panel through the [facility].    Pertinent history:  Menarche at 13.  Nulliparous.  Premenopausal.  Ovaries, fallopian tubes and uterus in place.  Hx of OCPs x10 years.  No hx of HRT    Pertinent screening history:  2015 - Left breast Mammogram  And L breast ultrasound for a palpable lump she identified. Category 1, negative. Dense breast tissue noted.  [facility] [place] [place]     At this visit,  she reports feeling well; she denies fatigue, pain, new breast asymmetry, nipple discharge, nipple inversion and changes in skin and shape of her breasts.     Past Medical/Surgical History:  Past Medical History:   Diagnosis Date     Lyme disease      Past Surgical History:   Procedure Laterality Date     CHOLECYSTOSTOMY       HC TOOTH EXTRACTION W/FORCEP         Allergies:  Allergies as of 11/10/2017     (No Known Allergies)       Current Medications:  Current Outpatient Prescriptions   Medication Sig Dispense Refill     Omega-3 Fatty Acids (FISH OIL CONCENTRATE PO)        vitamin B complex with vitamin C (VITAMIN  B COMPLEX) TABS tablet Take 1 tablet by mouth daily       multivitamin, therapeutic with minerals (MULTI-VITAMIN) TABS tablet Take 1 tablet by mouth daily       Probiotic Product (PROBIOTIC ADVANCED PO)           Family History:  Family History   Problem Relation Age of Onset     Breast Cancer Paternal Grandmother 60      at 67     Breast Cancer Cousin 40     maternal first cousin     Cervical Cancer Cousin 41     Breast Cancer Maternal Aunt 45     maternal great aunt     Pancreatic Cancer Other 79     maternal great granmother     Breast Cancer Paternal Aunt 49      at 49     Breast Cancer Paternal Aunt 65      at 69     Breast Cancer Other      paternal great aunt     Prostate Cancer Other 75     paternal great grandfather;  at 76     Autoimmune Disease No family hx of        Social History:  Social History     Social History     Marital status:  Single     Spouse name: N/A     Number of children: 0     Years of education: N/A     Occupational History      Student     [employer]     Social History Main Topics     Smoking status: Never Smoker     Smokeless tobacco: Never Used     Alcohol use 0.6 oz/week     1 Standard drinks or equivalent per week     Drug use: No     Sexual activity: Yes     Partners: Male     Birth control/ protection: Condom     Other Topics Concern     Not on file     Social History Narrative     Review of Systems:  GENERAL: No change in weight, sleep or appetite.  Normal energy.  No fever or chills  EYES: Negative for vision changes or eye problems  ENT: No problems with ears, nose or throat.  No difficulty swallowing.  RESP: No coughing, wheezing or shortness of breath  CV: No chest pains or palpitations  GI: No nausea, vomiting,  heartburn, abdominal pain, diarrhea, constipation or change in bowel habits  : No urinary frequency or dysuria, bladder or kidney problems  MUSCULOSKELETAL: No significant muscle or joint pains  NEUROLOGIC: No headaches, numbness, tingling, weakness, problems with balance or coordination  PSYCHIATRIC: No problems with anxiety, depression or mental health  HEME/IMMUNE/ALLERGY: No history of bleeding or clotting problems or anemia.  No allergies or immune system problems  ENDOCRINE: No history of thyroid disease, diabetes or other endocrine disorders  SKIN: No rashes,worrisome lesions or skin problems    Physical Exam:  /81  Pulse 65  Temp 97.7  F (36.5  C) (Oral)  Resp 16  Wt 59.8 kg (131 lb 13.4 oz)  SpO2 97%  BMI 19.47 kg/m2         GENERAL APPEARANCE: healthy, alert and no apparent distress  NECK: no adenopathy, no asymmetry or masses     LYMPHATICS: No cervical, supraclavicular or axillary lymphadenopathy     RESP: lungs clear to auscultation - no rales, rhonchi or wheezes     BREAST: A multipositional, bilateral breast exam was performed.  Fairly symmetrical -Lsl>R. Right breast: no palpable  dominant masses, no nipple discharge, no skin changes. Dense tissue.  Right axilla: no palpable adenopathy. Left breast: no palpable dominant masses, raised areas of likely thickened cartilage an inferior portion of breast, adjacent to ribcage, no nipple discharge, no skin changes. Left axilla: no palpable adenopathy. Dense tissue.   CARDIOVASCULAR: regular rate and rhythm, normal S1 S2, no S3 or S4 and no murmur.        SKIN: no suspicious lesions or rashes  Laboratory/Imaging Studies  Results for orders placed or performed in visit on 06/30/17   Next Generation Sequencing   Result Value Ref Range    Copath Report       Patient Name: ELADIA PORTER  MR#: [id number]  Specimen #: [id number]  Collected: 6/30/2017 13:16  Received: 7/9/2017 20:20  Reported: 7/13/2017 17:37  Ordering Phy(s): AGNIESZKA RHOADES    For improved result formatting, select 'View Enhanced Report Format'  under Linked Documents section.  _________________________________________    TEST(S) REQUESTED:  Next Generation Sequencing    SPECIMEN DESCRIPTION:  Blood  Collected 05/19/2017    CLINICAL COMMENTS:  Patient has no personal history of breast cancer, but has a significant  family history of early onset breast cancer.    TEST REQUESTED: Breast cancer expanded panel. Next generation sequencing  and copy number variation analysis of: GILBERT, BARD1, BRCA1, BRCA2, BRIP1,  CDH1, CHEK2, MRE11A, MUTYH, NBN, NF1, PALB2, PTEN, RAD50, RAD51C,  RAD51D, STK11, TP53.    RESULTS:          NEGATIVE                    Pathogenic Variant(s):                      None  Detected                  Variant(s) of Uncertain Significance:       None  Detected     INTERPRETATION:  No pathogenic sequence variants or clinically significant copy number  variants were detected in the genes analyzed. Therefore, this testing  did not identify a highly penetrant, single gene hereditary risk for  breast cancer in this patient. These results should be interpreted with  caution in the  context of this patient's significant family history of  cancer.  Genetic counseling regarding these results is recommended.    BACKGROUND:  Breast cancer is a common form of cancer most often resulting from  complex interaction of multiple genetic and environmental factors.  In a  subset of cases, typically characterized by young age of onset and/or  multiple affected family members, a hereditary factor may be identified.   Mutations in the BRCA1 and BRCA2 genes are the most common finding in  families with hereditary breast cancer.  Mutations in other genes have  been identified in a smaller number of families.  The majority of  hereditary breast cancer mutations are i nherited as autosomal dominant  risk factors and the risk for developing breast cancer varies depending  upon the specific gene and mutation identified.  Normal genetic testing  results do not completely exclude a risk for hereditary breast cancer  and results should be interpreted in the context of the patient's  clinical and family history.    COVERAGE:  This analysis is limited to the coding exons and immediately adjoining  intronic sequences of the analyzed genes.  Unless specifically indicated  below, all analyzed coding exonic bases have either a minimum of 20x  coverage or have been analyzed by Madi sequencing.  Coverage at 20x is  not guaranteed for intronic positions. Therefore, intronic variants  cannot be confirmed or excluded with the same degree of confidence as  coding exonic variants.  Sequences that reside more than 25 base pairs  from a coding exon are not genotyped and mutations in these regions will  not be detected by this analysis.    Any coding bases that are co sukhi at less than 20x but greater than 15x  are specifically indicated below.  The sensitivity for detection of  heterozygous positions in these areas is reduced to 98.8-99.9% (assuming  the distribution of variant/reference calls is between 30%/70%  "and  70%/30%).    Gene     Coordinates     % of bases with >20x coverage  BARD1     chr2:[id number]     96.0  NBN     chr8:[id number]     99.5  NF1     chr17:[id number]     92.4  NF1     chr17:[id number]     96.7  NF1     chr17:[id number]     99.3    METHODOLOGY [Kirk et al., 2015][Alpesh et al., 2014]:  Genomic DNA is extracted from the sample, and sequencing libraries are  prepared according to standard Illumina protocols utilizing the TruSight  One Sequencing Panel for enrichment of targeted genes.  The enriched DNA  libraries are sequenced on an Illumina HiSeq 2500 instrument.  Raw  sequencing reads are mapped to the reference genome using BWA. Raw  alignment files are realigned in the neighborhood  of indels, and  recalibrated for base quality accuracy using the Genome Analysis Tool  Kit (GATK). Point mutation and indel calls in exons and adjoining  intronic regions are made using the GATK Unified Genotyper.  Variants  are interpreted according to guidance issued by the American College of  Medical Genetics [Nanette et al.2015].    Pathogenic and predicted pathogenic variants that meet ALL of the  following criteria are reported without validation by Madi sequencin- single nucleotide substitution, 2- receives a \"PASS\" from the  variant recalibrator, 3- has a QUAL score of >300, 4- has a VAF in the  accepted range (heterozygous = 0.3-0.6, homozygous/hemizygous >0.9), and  5- has a minimum of 20x coverage.  Pathogenic variants that fail to meet  any of these criteria are verified by [place] sequencing prior to  reporting [Wu et al., 2015].    For copy number variation (CNV) analysis, raw sequencing reads are  mapped to the reference human genome (HG19) using both BWA a nd Bowtie  algorithms.  CNV ratios are computed by comparing the average coverage  in the sample across 60 base pair windows.  Average coverage is compared  to control loci both within the sample " and within a gender-matched  control sample from the same run.  The BWA/Bowtie coverage ratio is  calculated for each window in order to identify regions where the  presence of homologous sequences precludes accurate CNV calls.  Heterozygous deletion calls are made when 3 consecutive windows have a  CNV ratio of 0.3-0.7; homozygous/hemizygous deletion calls are made when  3 consecutive windows have a CNV ratio <0.3;duplication calls are made  when 5 consecutive windows have a CNV ratio >1.4.  Calls are only made  in areas where the BWA/Bowtie ratio is 0.9-1.1 and the average coverage  is 20x.  All CNV calls are confirmed with a supplementary qPCR assay.    ADDITIONAL VARIANTS IDENTIFIED:  The following types of variants are not included in the clinical report,  but information about these vari ants is available upon request:    * Missense variants that are present at >1% MAF in population databases  AND are not reported as pathogenic/likely pathogenic in ClinVar.  * Missense variants with a MAF <1%, that are classified as benign or  likely benign according to published ACMG criteria.  * Synonymous variants that do not occur at intron/exon boundaries, are  not reported as pathogenic mutations in relevant clinical databases, and  are present in 15 or more individuals in ExAC.  * Intronic variants that reside more than 5 bases from the exon/intron  boundary AND are not reported as pathogenic/likely pathogenic in  ClinVar.  Known pathogenic variants residing 5-25bp from an intron/exon  boundary will be reported.  * Untranslated region (UTR) variants not previously categorized as  pathogenic or likely pathogenic in relevant clinical databases.  * Some variants may be excluded based on review of sequencing quality  data.  It is possible that some low quality variants are, in fact,  real.    LIMITATIONS: This analysis has not been validated for detection of  insertion/deletion mutations larger than 18bp in length. In  addition,  this analysis will not detect large structural variations, such as whole  gene deletions. The size of polymorphic repetitive sequences such as CAG  repeats, intronic dinucleotide repeats, or intronic polyT/polyA  sequences, cannot be determined by this analysis.    The CNV algorithm is not validated to detect deletions encompassing less  than 180 base pairs of coding sequence or duplications encompassing less  than 300 base pairs of coding sequence.  The CNV algorithm does not  define precise breakpoints for duplications or deletions.    REFERENCES:  Wu KHAN, Santiago BILLS, Bebeto SLATER, Marc URIAS, Alpesh UMAÑA, et al. 2015.  Criteria for Clinical Reporting of Variants from a Broad Target Capture  NGS Assay without Maid Verification. JSM biomarkers 2(1):1005.    Alpesh UMAÑA, Santiago Fletcher Henzler C, Vale BILLS, Marin JIMENEZ,  Thyagararhonda B.  2016. CNV-RF Is a Random [place]-Based Copy Number  Variation Detection Method Using Next-Generation Sequencing. J Mol  Diagn. 18(6):872-881.    Alpesh UMAÑA, Artemio KHOURY, Radha DUNBAR, Ruth KHOURY, Abdiel MCCORMICK, Mouna A, Kirk S,  Vale BILLS, Santiago BILLS, Marin JIMENEZ, Thyagarajan B. 2014.  Implementation of Cloud based next generation sequencing data analysis  in a clinical laboratory. BMC Res Notes. 7:314.    Nanette CS, Jun S, Juan DB, Taylor S, Chuyita WW, Calos MR, Karan E,  Abdifatah BE, Molecular Subcommittee of the ACMG Laboratory   Committee. 2008. ACMG recommendations for standards for interpretation  and reporting of sequence variations: Revisions 2007. Keren. Med.  10(4):294-300.    Sammy JA, Ramya AW, O'Doni TD, Omer W, Orestes EE, Ronaldo S, Laboy S,  Do R, Tracy X, Tyson G, Jaison HM, Branden D, Ethel SM, Cole S, Nazanin MJ,  Carlos G, Jagjit D, Javon DA, Jm E, Sunlatasha S,  Travon CD, Kelvin ONEILL, Isaias JM, Broad GO, [place] GO, [facility] Exome  Sequencing Pro ject. 2012. Evolution and functional impact of rare coding  variation from deep  sequencing of human exomes. Science. 337(5826):64-9.    Kirk S, Mouna A, Patience K, Bharti T, Santiago M, Vale M, Alpesh G,  Robert J, Artemio J, Luis Fernando Y, Flores CORNEJO, Radha DUNBAR, Abdiel CHADWICK,  Marin JIMENEZ, Cassie SEYMOUR. 2015. Clinical validation of targeted  next-generation sequencing for inherited disorders. Arch. Pathol. Lab.  Med. 139(2):204-10.    If a patient is the recipient of an allogeneic bone marrow transplant,  this test must be done on a pre-transplant sample or buccal swab.  A  previous allogeneic bone marrow transplant will interfere with test  results.  Call the [facility]([phone]) for  instructions on sample collection for these patients.    This test was developed and its performance characteristics determined  by the [facility],  Molecular Diagnostics  Laboratory and the [facility](Cancer &  Cardiovascular Rese arch Building Room [address], [address] [place] [address]).   Genomic DNA extraction, interpretation of  sequencing data, and when necessary, Madi sequencing is performed at  [facility],  Molecular Diagnostics  Laboratory.   Wet bench processes including library creation, sequence  capture using an Illumina Snapsheet 2500 analyzer and bioinformatics is  performed at the [facility] [place].  It has not  been cleared or approved by the FDA. The laboratory is regulated under  CLIA as qualified to perform high-complexity testing. This test is used  for clinical purposes. It should not be regarded as investigational or  for research.    A resident/fellow in an accredited training program was involved in the  selection of testing, review of laboratory data, and/or interpretation  of this case.  I, as the senior physician, attest that I: (i) confirmed  appropriate testing, (ii) examined the relevant raw data for the  specimen(s); and (ii i) rendered or confirmed  the interpretation(s).    Electronically Signed Out By:  Adam Washburn M.D., [facility]    CPT Codes:    TESTING LAB LOCATION:  [facility]  [address] [place], [facility] [address]  [address] [place] [id number] [phone]    COLLECTION SITE:  Client:  General acute hospital  [facility]  [facility]         ASSESSMENT  We discussed because the youngest person with breast cancer in her family was 40, it would be reasonable to start screening between 30-39; she is interested in starting when she is 30 and will contact me again at that time.  We discussed breast health, breast concerns and signs and symptoms to be watchful for. She practiced palpating with the clinic's breast simulation model and verbalized understanding of signs and symptoms to address with her health care providers including lumps, thickening, swelling, tenderness, nipple discharge or changes in skin of breasts.    We also reviewed the  healthy lifestyle plan recommended by both NCCN and the American Cancer Society that can reduce the risk of breast cancer:  1 Limit alcohol consumption to less than 1 drink per day (1 drink=5 oz.wine, 12 oz. Beer or 1.5 oz. 80-proof liquor).  2. Exercise per American Cancer Society guidelines of at least 150 minutes of moderate-intensity activity or 75 minutes of vigorous activity each week. (Or a combination of both.) Exercise should be spread  out over the week.  3. Maintain a healthy weight with a Body Mass Index between 19-24.9.  4. Do not use tobacco products and limit exposure to passive smoke.  5. Breastfeed if possible. Research shows that 16+ months of breastfeeding, over a lifetime, can reduce a woman's risk of breast cancer by up to 25%.    Individualized Surveillance Plan for women  With 20% or greater lifetime risk of breast cancer   Per NCCN Breast Cancer Screening and Diagnosis Guidelines Version 1.2017    Recommended screening Test or  procedure Last done Next Scheduled    Clinical encounter Ongoing risk assessment, risk reduction counseling and clinical breast exam, every 6-12 months. 11/10/2017 Ongoing   However, some family histories with breast cancers at a very young age, may warrant screening starting earlier.    *May begin at age 40 if breast cancers in the family occur at later ages.    Annual mammogram beginning 10 years younger than the earliest breast cancer in the family but not prior to age 30.    Recommend annual breast MRI to begin 10 years younger than the earliest breast cancer in the family but not prior to age 25.    Breast MRIs are preferably done on day 7-15 of the menstrual cycle in premenopausal women. May begin at age 30 with baseline mammogram    Breast screening for patients at high risk due to thoracic radiation between the ages of 10-30     Begin 8-10 years post radiation treatment or age 25.   Annual mammogram   and  Annual breast MRI, preferably on day 7-15 of menstrual cycle for premenopausal women.    Annual clinical breast exams with ongoing risk assessment and risk reduction counseling until age 25, then every 6-12 months.   NA     NA       I spent 45 minutes with the patient with greater that 50% of it in counseling and coordinating care as documented above.    Tete Jarrell, GABE-CNS, OCN, ANG-BC  Clinical Nurse Specialist  Cancer Risk Management Program  [address]  [place] [address]    phone:  [phone]  Pager: [phone]  fax: [phone]

## 2017-11-10 NOTE — PROGRESS NOTES
Oncology Risk Management Consultation:  Date on this visit: 11/10/2017    Arabella Farris is referred by Kerrie Aleman, Certified Genetic Counselor,  for an oncology risk management consultation. She requires evaluation for her risk of cancer secondary to having a family history of breast cancer in a maternal first cousin at 40, maternal great aunt at 45, paternal aunt at 49, paternal grandmother at 60 and several other relatives.. She is considered to at high risk for breast cancer and has a 22.6% lifetime risk for breast cancer by the MARINA model.     Primary Physician: No primary care provider on file.     History Of Present Illness:  Ms. Farris is a very pleasant, healthy 27 year old female who presents with family history of breast cancer.    Genetic testin2017 - NEGATIVE for genetic mutations in 18 genes associated with breast cancer including GILBERT, BARD1, BRCA1, BRCA2, BRIP1, CDH1, CHEK2, MRE11A, MUTYH, NBN, NF1, PALB2, PTEN, RAD50, RAD51C, RAD51D, STK11, and TP53 genes by sequencing and deletion/duplication analysis. The testing was done using a Breast Expanded panel through the [facility].    Pertinent history:  Menarche at 13.  Nulliparous.  Premenopausal.  Ovaries, fallopian tubes and uterus in place.  Hx of OCPs x10 years.  No hx of HRT    Pertinent screening history:  2015 - Left breast Mammogram  And L breast ultrasound for a palpable lump she identified. Category 1, negative. Dense breast tissue noted.  ([facility], [place], [place])     At this visit,  she reports feeling well; she denies fatigue, pain, new breast asymmetry, nipple discharge, nipple inversion and changes in skin and shape of her breasts.     Past Medical/Surgical History:  Past Medical History:   Diagnosis Date     Lyme disease      Past Surgical History:   Procedure Laterality Date     CHOLECYSTOSTOMY  2016      TOOTH EXTRACTION W/FORCEP          Allergies:  Allergies as of 11/10/2017     (No Known Allergies)       Current Medications:  Current Outpatient Prescriptions   Medication Sig Dispense Refill     Omega-3 Fatty Acids (FISH OIL CONCENTRATE PO)        vitamin B complex with vitamin C (VITAMIN  B COMPLEX) TABS tablet Take 1 tablet by mouth daily       multivitamin, therapeutic with minerals (MULTI-VITAMIN) TABS tablet Take 1 tablet by mouth daily       Probiotic Product (PROBIOTIC ADVANCED PO)           Family History:  Family History   Problem Relation Age of Onset     Breast Cancer Paternal Grandmother 60      at 67     Breast Cancer Cousin 40     maternal first cousin     Cervical Cancer Cousin 41     Breast Cancer Maternal Aunt 45     maternal great aunt     Pancreatic Cancer Other 79     maternal great granmother     Breast Cancer Paternal Aunt 49      at 49     Breast Cancer Paternal Aunt 65      at 69     Breast Cancer Other      paternal great aunt     Prostate Cancer Other 75     paternal great grandfather;  at 76     Autoimmune Disease No family hx of        Social History:  Social History     Social History     Marital status: Single     Spouse name: N/A     Number of children: 0     Years of education: N/A     Occupational History      Student     [employer]     Social History Main Topics     Smoking status: Never Smoker     Smokeless tobacco: Never Used     Alcohol use 0.6 oz/week     1 Standard drinks or equivalent per week     Drug use: No     Sexual activity: Yes     Partners: Male     Birth control/ protection: Condom     Other Topics Concern     Not on file     Social History Narrative     Review of Systems:  GENERAL: No change in weight, sleep or appetite.  Normal energy.  No fever or chills  EYES: Negative for vision changes or eye problems  ENT: No problems with ears, nose or throat.  No difficulty swallowing.  RESP: No coughing, wheezing or shortness of breath  CV: No chest pains or palpitations  GI: No nausea,  vomiting,  heartburn, abdominal pain, diarrhea, constipation or change in bowel habits  : No urinary frequency or dysuria, bladder or kidney problems  MUSCULOSKELETAL: No significant muscle or joint pains  NEUROLOGIC: No headaches, numbness, tingling, weakness, problems with balance or coordination  PSYCHIATRIC: No problems with anxiety, depression or mental health  HEME/IMMUNE/ALLERGY: No history of bleeding or clotting problems or anemia.  No allergies or immune system problems  ENDOCRINE: No history of thyroid disease, diabetes or other endocrine disorders  SKIN: No rashes,worrisome lesions or skin problems    Physical Exam:  /81  Pulse 65  Temp 97.7  F (36.5  C) (Oral)  Resp 16  Wt 59.8 kg (131 lb 13.4 oz)  SpO2 97%  BMI 19.47 kg/m2         GENERAL APPEARANCE: healthy, alert and no apparent distress  NECK: no adenopathy, no asymmetry or masses     LYMPHATICS: No cervical, supraclavicular or axillary lymphadenopathy     RESP: lungs clear to auscultation - no rales, rhonchi or wheezes     BREAST: A multipositional, bilateral breast exam was performed.  Fairly symmetrical -Lsl>R. Right breast: no palpable dominant masses, no nipple discharge, no skin changes. Dense tissue.  Right axilla: no palpable adenopathy. Left breast: no palpable dominant masses, raised areas of likely thickened cartilage an inferior portion of breast, adjacent to ribcage, no nipple discharge, no skin changes. Left axilla: no palpable adenopathy. Dense tissue.   CARDIOVASCULAR: regular rate and rhythm, normal S1 S2, no S3 or S4 and no murmur.        SKIN: no suspicious lesions or rashes  Laboratory/Imaging Studies  Results for orders placed or performed in visit on 06/30/17   Next Generation Sequencing   Result Value Ref Range    Copath Report       Patient Name: ELADIA PORTER  MR#: [id number]  Specimen #: [id number]  Collected: 6/30/2017 13:16  Received: 7/9/2017 20:20  Reported: 7/13/2017 17:37  Ordering Phy(s): AGNIESZKA  VERONA    For improved result formatting, select 'View Enhanced Report Format'  under Linked Documents section.  _________________________________________    TEST(S) REQUESTED:  Next Generation Sequencing    SPECIMEN DESCRIPTION:  Blood  Collected 05/19/2017    CLINICAL COMMENTS:  Patient has no personal history of breast cancer, but has a significant  family history of early onset breast cancer.    TEST REQUESTED: Breast cancer expanded panel. Next generation sequencing  and copy number variation analysis of: GILBERT, BARD1, BRCA1, BRCA2, BRIP1,  CDH1, CHEK2, MRE11A, MUTYH, NBN, NF1, PALB2, PTEN, RAD50, RAD51C,  RAD51D, STK11, TP53.    RESULTS:          NEGATIVE                    Pathogenic Variant(s):                      None  Detected                  Variant(s) of Uncertain Significance:       None  Detected     INTERPRETATION:  No pathogenic sequence variants or clinically significant copy number  variants were detected in the genes analyzed. Therefore, this testing  did not identify a highly penetrant, single gene hereditary risk for  breast cancer in this patient. These results should be interpreted with  caution in the context of this patient's significant family history of  cancer.  Genetic counseling regarding these results is recommended.    BACKGROUND:  Breast cancer is a common form of cancer most often resulting from  complex interaction of multiple genetic and environmental factors.  In a  subset of cases, typically characterized by young age of onset and/or  multiple affected family members, a hereditary factor may be identified.   Mutations in the BRCA1 and BRCA2 genes are the most common finding in  families with hereditary breast cancer.  Mutations in other genes have  been identified in a smaller number of families.  The majority of  hereditary breast cancer mutations are i nherited as autosomal dominant  risk factors and the risk for developing breast cancer varies depending  upon the specific  gene and mutation identified.  Normal genetic testing  results do not completely exclude a risk for hereditary breast cancer  and results should be interpreted in the context of the patient's  clinical and family history.    COVERAGE:  This analysis is limited to the coding exons and immediately adjoining  intronic sequences of the analyzed genes.  Unless specifically indicated  below, all analyzed coding exonic bases have either a minimum of 20x  coverage or have been analyzed by [place] sequencing.  Coverage at 20x is  not guaranteed for intronic positions. Therefore, intronic variants  cannot be confirmed or excluded with the same degree of confidence as  coding exonic variants.  Sequences that reside more than 25 base pairs  from a coding exon are not genotyped and mutations in these regions will  not be detected by this analysis.    Any coding bases that are co sukhi at less than 20x but greater than 15x  are specifically indicated below.  The sensitivity for detection of  heterozygous positions in these areas is reduced to 98.8-99.9% (assuming  the distribution of variant/reference calls is between 30%/70% and  70%/30%).    Gene     Coordinates     % of bases with >20x coverage  BARD1     chr2:[id number]     96.0  NBN     chr8:[id number]     99.5  NF1     chr17:[id number]     92.4  NF1     chr17:[id number]     96.7  NF1     chr17:[id number]     99.3    METHODOLOGY [Kirk et al., 2015][Alpesh et al., 2014]:  Genomic DNA is extracted from the sample, and sequencing libraries are  prepared according to standard Illumina protocols utilizing the TruSight  One Sequencing Panel for enrichment of targeted genes.  The enriched DNA  libraries are sequenced on an Illumina HiSeq 2500 instrument.  Raw  sequencing reads are mapped to the reference genome using BWA. Raw  alignment files are realigned in the neighborhood  of indels, and  recalibrated for base quality accuracy using the  "Genome Analysis Tool  Kit (GATEoscene). Point mutation and indel calls in exons and adjoining  intronic regions are made using the GATK Unified Genotyper.  Variants  are interpreted according to guidance issued by the American College of  Medical Genetics [Nanette et al.2015].    Pathogenic and predicted pathogenic variants that meet ALL of the  following criteria are reported without validation by [place] sequencin- single nucleotide substitution, 2- receives a \"PASS\" from the  variant recalibrator, 3- has a QUAL score of >300, 4- has a VAF in the  accepted range (heterozygous = 0.3-0.6, homozygous/hemizygous >0.9), and  5- has a minimum of 20x coverage.  Pathogenic variants that fail to meet  any of these criteria are verified by [place] sequencing prior to  reporting [Wu et al., 2015].    For copy number variation (CNV) analysis, raw sequencing reads are  mapped to the reference human genome (HG19) using both BWA a nd Bowtie  algorithms.  CNV ratios are computed by comparing the average coverage  in the sample across 60 base pair windows.  Average coverage is compared  to control loci both within the sample and within a gender-matched  control sample from the same run.  The BWA/Bowtie coverage ratio is  calculated for each window in order to identify regions where the  presence of homologous sequences precludes accurate CNV calls.  Heterozygous deletion calls are made when 3 consecutive windows have a  CNV ratio of 0.3-0.7; homozygous/hemizygous deletion calls are made when  3 consecutive windows have a CNV ratio <0.3;duplication calls are made  when 5 consecutive windows have a CNV ratio >1.4.  Calls are only made  in areas where the BWA/Bowtie ratio is 0.9-1.1 and the average coverage  is 20x.  All CNV calls are confirmed with a supplementary qPCR assay.    ADDITIONAL VARIANTS IDENTIFIED:  The following types of variants are not included in the clinical report,  but information about these vari ants is " available upon request:    * Missense variants that are present at >1% MAF in population databases  AND are not reported as pathogenic/likely pathogenic in ClinVar.  * Missense variants with a MAF <1%, that are classified as benign or  likely benign according to published ACMG criteria.  * Synonymous variants that do not occur at intron/exon boundaries, are  not reported as pathogenic mutations in relevant clinical databases, and  are present in 15 or more individuals in ExAC.  * Intronic variants that reside more than 5 bases from the exon/intron  boundary AND are not reported as pathogenic/likely pathogenic in  ClinVar.  Known pathogenic variants residing 5-25bp from an intron/exon  boundary will be reported.  * Untranslated region (UTR) variants not previously categorized as  pathogenic or likely pathogenic in relevant clinical databases.  * Some variants may be excluded based on review of sequencing quality  data.  It is possible that some low quality variants are, in fact,  real.    LIMITATIONS: This analysis has not been validated for detection of  insertion/deletion mutations larger than 18bp in length. In addition,  this analysis will not detect large structural variations, such as whole  gene deletions. The size of polymorphic repetitive sequences such as CAG  repeats, intronic dinucleotide repeats, or intronic polyT/polyA  sequences, cannot be determined by this analysis.    The CNV algorithm is not validated to detect deletions encompassing less  than 180 base pairs of coding sequence or duplications encompassing less  than 300 base pairs of coding sequence.  The CNV algorithm does not  define precise breakpoints for duplications or deletions.    REFERENCES:  Wu KHAN, Bebeto Tai Henzler C, Alpesh UMAÑA, et al. 2015.  Criteria for Clinical Reporting of Variants from a Broad Target Capture  NGS Assay without [place] Verification. JS biomarkers 2(1):1005.    Bebeto Portillo Bower M, Henzler C,  Vale BILLS, Marin JIMENEZ,  Cassie B.  2016. CNV-RF Is a Random [place]-Based Copy Number  Variation Detection Method Using Next-Generation Sequencing. J Mol  Diagn. 18(6):872-881.    Artemio Portillo Spears MD, Abdiel Kulkarni, Mouna CORNEJO, Kirk MCKAY,  Santiago Barber, Marin JIMENEZ, Cassie B. 2014.  Implementation of Cloud based next generation sequencing data analysis  in a clinical laboratory. BMC Res Notes. 7:314.    Nanette CS, Jun S, Juan DB, Taylor S, Chuyita WW, Calos MR, Karan E,  Abdifatah BE, Molecular Subcommittee of the ACMG Laboratory   Committee. 2008. ACMG recommendations for standards for interpretation  and reporting of sequence variations: Revisions 2007. [place]. Med.  10(4):294-300.    Sammy JA, Ramya AW, O'Doni TD, Omer W, Orestes EE, Ronaldo S, Laboy S,  Do R, Tracy X, Tyson G, Jaison HM, Branden D, Ethel SM, Cole S, Nazanin MJ,  Carlos G, Jagjit D, Javon DA, Jm E, Sunlatasha S,  Travon CD, Kelvin ONEILL, Isaias JM, [facility] GO, [place] GO, [facility] Exome  Sequencing Pro ject. 2012. Evolution and functional impact of rare coding  variation from deep sequencing of human exomes. Science. 337(2401):64-9.    Kirk MCKAY, Mouna CORNEJO, Patience K, Bharti T, Vale Tai, Robert Portillo Erdmann J, Luis Fernando Y, Radha Orozco MD, Abdiel CHADWICK,  Marin JIMENEZ, Cassie B. 2015. Clinical validation of targeted  next-generation sequencing for inherited disorders. Arch. Pathol. Lab.  Med. 139(2):204-10.    If a patient is the recipient of an allogeneic bone marrow transplant,  this test must be done on a pre-transplant sample or buccal swab.  A  previous allogeneic bone marrow transplant will interfere with test  results.  Call the Molecular Diagnostics Lab([phone]) for  instructions on sample collection for these patients.    This test was developed and its performance characteristics determined  by the [facility],  Molecular  Diagnostics  Laboratory and the [facility] [place](Cancer &  Cardiovascular Rese arch Building Room 1-210, [address] [place] [id number]).   Genomic DNA extraction, interpretation of  sequencing data, and when necessary, [place] sequencing is performed at  [facility],  Molecular Diagnostics  Laboratory.   Wet bench processes including library creation, sequence  capture using an Illumina Smile Family 2500 analyzer and bioinformatics is  performed at the [facility].  It has not  been cleared or approved by the FDA. The laboratory is regulated under  CLIA as qualified to perform high-complexity testing. This test is used  for clinical purposes. It should not be regarded as investigational or  for research.    A resident/fellow in an accredited training program was involved in the  selection of testing, review of laboratory data, and/or interpretation  of this case.  I, as the senior physician, attest that I: (i) confirmed  appropriate testing, (ii) examined the relevant raw data for the  specimen(s); and (ii i) rendered or confirmed the interpretation(s).    Electronically Signed Out By:  Adam Washburn M.D., [facility]    CPT Codes:    TESTING LAB LOCATION:  [address] [address]  [phone]    COLLECTION SITE:  Client:  [facility]  Location:  [facility] ()         ASSESSMENT  We discussed because the youngest person with breast cancer in her family was 40, it would be reasonable to start screening between 30-39; she is interested in starting when she is 30 and will contact me again at that time.  We discussed breast health, breast concerns and signs and symptoms to be watchful for. She practiced palpating with the clinic's breast simulation model and verbalized understanding of signs and symptoms to address  with her health care providers including lumps, thickening, swelling, tenderness, nipple discharge or changes in skin of breasts.    We also reviewed the  healthy lifestyle plan recommended by both NCCN and the American Cancer Society that can reduce the risk of breast cancer:  1 Limit alcohol consumption to less than 1 drink per day (1 drink=5 oz.wine, 12 oz. Beer or 1.5 oz. 80-proof liquor).  2. Exercise per American Cancer Society guidelines of at least 150 minutes of moderate-intensity activity or 75 minutes of vigorous activity each week. (Or a combination of both.) Exercise should be spread  out over the week.  3. Maintain a healthy weight with a Body Mass Index between 19-24.9.  4. Do not use tobacco products and limit exposure to passive smoke.  5. Breastfeed if possible. Research shows that 16+ months of breastfeeding, over a lifetime, can reduce a woman's risk of breast cancer by up to 25%.    Individualized Surveillance Plan for women  With 20% or greater lifetime risk of breast cancer   Per NCCN Breast Cancer Screening and Diagnosis Guidelines Version 1.2017    Recommended screening Test or procedure Last done Next Scheduled    Clinical encounter Ongoing risk assessment, risk reduction counseling and clinical breast exam, every 6-12 months. 11/10/2017 Ongoing   However, some family histories with breast cancers at a very young age, may warrant screening starting earlier.    *May begin at age 40 if breast cancers in the family occur at later ages.    Annual mammogram beginning 10 years younger than the earliest breast cancer in the family but not prior to age 30.    Recommend annual breast MRI to begin 10 years younger than the earliest breast cancer in the family but not prior to age 25.    Breast MRIs are preferably done on day 7-15 of the menstrual cycle in premenopausal women. May begin at age 30 with baseline mammogram    Breast screening for patients at high risk due to thoracic radiation between  the ages of 10-30     Begin 8-10 years post radiation treatment or age 25.   Annual mammogram   and  Annual breast MRI, preferably on day 7-15 of menstrual cycle for premenopausal women.    Annual clinical breast exams with ongoing risk assessment and risk reduction counseling until age 25, then every 6-12 months.   NA     NA       I spent 45 minutes with the patient with greater that 50% of it in counseling and coordinating care as documented above.    Tete Jarrell, GABE-CNS, OCN, ANG-BC  Clinical Nurse Specialist  Cancer Risk Management Program  [address]  [place] [address]    phone:  [phone]  Pager: [phone]  fax: [phone]

## 2017-11-10 NOTE — PATIENT INSTRUCTIONS
Individualized Surveillance Plan for women  With 20% or greater lifetime risk of breast cancer   Per NCCN Breast Cancer Screening and Diagnosis Guidelines Version 1.2017    Recommended screening Test or procedure Last done Next Scheduled    Clinical encounter Ongoing risk assessment, risk reduction counseling and clinical breast exam, every 6-12 months. 11/10/2017 Ongoing   However, some family histories with breast cancers at a very young age, may warrant screening starting earlier.    *May begin at age 40 if breast cancers in the family occur at later ages.    Annual mammogram beginning 10 years younger than the earliest breast cancer in the family but not prior to age 30.    Recommend annual breast MRI to begin 10 years younger than the earliest breast cancer in the family but not prior to age 25.    Breast MRIs are preferably done on day 7-15 of the menstrual cycle in premenopausal women.   Check in with Tete at age 30.    May begin at age 39 with baseline mammogram   Breast screening for patients at high risk due to thoracic radiation between the ages of 10-30     Begin 8-10 years post radiation treatment or age 25.   Annual mammogram   and  Annual breast MRI, preferably on day 7-15 of menstrual cycle for premenopausal women.    Annual clinical breast exams with ongoing risk assessment and risk reduction counseling until age 25, then every 6-12 months.   NA     NA

## 2017-11-10 NOTE — MR AVS SNAPSHOT
After Visit Summary   11/10/2017    Arabella Farris    MRN: [id number]           Patient Information     Date Of Birth          1990        Visit Information        Provider Department      11/10/2017 2:30 PM Tete Jarrell APRN [facility]        Today's Diagnoses     Family history of malignant neoplasm of breast          Care Instructions    Individualized Surveillance Plan for women  With 20% or greater lifetime risk of breast cancer   Per NCCN Breast Cancer Screening and Diagnosis Guidelines Version 1.2017    Recommended screening Test or procedure Last done Next Scheduled    Clinical encounter Ongoing risk assessment, risk reduction counseling and clinical breast exam, every 6-12 months. 11/10/2017 Ongoing   However, some family histories with breast cancers at a very young age, may warrant screening starting earlier.    *May begin at age 40 if breast cancers in the family occur at later ages.    Annual mammogram beginning 10 years younger than the earliest breast cancer in the family but not prior to age 30.    Recommend annual breast MRI to begin 10 years younger than the earliest breast cancer in the family but not prior to age 25.    Breast MRIs are preferably done on day 7-15 of the menstrual cycle in premenopausal women.   Check in with Tete at age 30.    May begin at age 39 with baseline mammogram   Breast screening for patients at high risk due to thoracic radiation between the ages of 10-30     Begin 8-10 years post radiation treatment or age 25.   Annual mammogram   and  Annual breast MRI, preferably on day 7-15 of menstrual cycle for premenopausal women.    Annual clinical breast exams with ongoing risk assessment and risk reduction counseling until age 25, then every 6-12 months.   NA     NA               Follow-ups after your visit        Who to contact     If you have questions or need follow up information about today's clinic visit or  your schedule please contact [facility] directly at [phone].  Normal or non-critical lab and imaging results will be communicated to you by MyChart, letter or phone within 4 business days after the clinic has received the results. If you do not hear from us within 7 days, please contact the clinic through [employer] or phone. If you have a critical or abnormal lab result, we will notify you by phone as soon as possible.  Submit refill requests through [employer] or call your pharmacy and they will forward the refill request to us. Please allow 3 business days for your refill to be completed.          Additional Information About Your Visit        [employer] Information     [employer] gives you secure access to your electronic health record. If you see a primary care provider, you can also send messages to your care team and make appointments. If you have questions, please call your primary care clinic.  If you do not have a primary care provider, please call [phone] and they will assist you.        Care EveryWhere ID     This is your Care EveryWhere ID. This could be used by other organizations to access your [facility] medical records  [id number]        Your Vitals Were     Pulse Temperature Respirations Pulse Oximetry BMI (Body Mass Index)       65 97.7  F (36.5  C) (Oral) 16 97% 19.47 kg/m2        Blood Pressure from Last 3 Encounters:   11/10/17 127/81   03/23/16 130/86   01/20/16 130/73    Weight from Last 3 Encounters:   11/10/17 59.8 kg (131 lb 13.4 oz)   03/23/16 61.9 kg (136 lb 8 oz)   01/20/16 57.6 kg (127 lb)              Today, you had the following     No orders found for display         Today's Medication Changes          These changes are accurate as of: 11/10/17 11:59 PM.  If you have any questions, ask your nurse or doctor.               Stop taking these medicines if you haven't already. Please contact your care team if you have questions.     UNKNOWN TO PATIENT   Stopped by:   Tete Jarrell, APRN CNS                    Primary Care Provider    None Specified       No primary provider on file.        Equal Access to Services     JULIO CESAR MARINELLI : Hadii aad ku hadbrandincourtney Gageali, wamanuelda luqadaha, qaybta kaalmada litayshajohn, rudy darden hayalejandro mansfieldhinameagan alexander. So [facility] [phone].    ATENCIÓN: Si habla español, tiene a fernandez disposición servicios gratuitos de asistencia lingüística. Llame al [phone].    We comply with applicable federal civil rights laws and [place] laws. We do not discriminate on the basis of race, color, national origin, age, disability, sex, sexual orientation, or gender identity.            Thank you!     Thank you for choosing [facility]  for your care. Our goal is always to provide you with excellent care. Hearing back from our patients is one way we can continue to improve our services. Please take a few minutes to complete the written survey that you may receive in the mail after your visit with us. Thank you!             Your Updated Medication List - Protect others around you: Learn how to safely use, store and throw away your medicines at www.disposemymeds.org.          This list is accurate as of: 11/10/17 11:59 PM.  Always use your most recent med list.                   Brand Name Dispense Instructions for use Diagnosis    FISH OIL CONCENTRATE PO           Multi-vitamin Tabs tablet      Take 1 tablet by mouth daily        PROBIOTIC ADVANCED PO           vitamin B complex with vitamin C Tabs tablet      Take 1 tablet by mouth daily

## 2017-11-10 NOTE — NURSING NOTE
"Oncology Rooming Note    November 10, 2017 2:25 PM   Arabella Farris is a 27 year old female who presents for:    Chief Complaint   Patient presents with     Family History Of Cancer     Oncology Clinic Visit     New for High risk of Breast Ca      Initial Vitals: /81  Pulse 65  Temp 97.7  F (36.5  C) (Oral)  Resp 16  Wt 59.8 kg (131 lb 13.4 oz)  SpO2 97%  BMI 19.47 kg/m2 Estimated body mass index is 19.47 kg/(m^2) as calculated from the following:    Height as of 3/23/16: 1.753 m (5' 9\").    Weight as of this encounter: 59.8 kg (131 lb 13.4 oz). Body surface area is 1.71 meters squared.  No Pain (0) Comment: Data Unavailable   No LMP recorded.  Allergies reviewed: Yes  Medications reviewed: Yes    Medications: Medication refills not needed today.  Pharmacy name entered into [employer]: [id number] - [place], [place] - [address]    Clinical concerns: no  [facility] was notified.    7 minutes for nursing intake (face to face time)     Radha Alvarez MA              "

## 2017-11-12 PROBLEM — J30.9 ALLERGIC RHINITIS: Status: ACTIVE | Noted: 2017-11-12

## 2017-12-31 ENCOUNTER — HEALTH MAINTENANCE LETTER (OUTPATIENT)
Age: 27
End: 2017-12-31

## 2020-03-10 ENCOUNTER — HEALTH MAINTENANCE LETTER (OUTPATIENT)
Age: 30
End: 2020-03-10

## 2020-06-02 NOTE — MR AVS SNAPSHOT
After Visit Summary   5/19/2017    Arabella Farris    MRN: [id number]           Patient Information     Date Of Birth          1990        Visit Information        Provider Department      5/19/2017 12:00 PM Kerrie Aleman GC;  2 118 CONSULT [facility]        Today's Diagnoses     Family history of malignant neoplasm of breast    -  1    Family history of pancreatic cancer        Family history of prostate cancer          Care Instructions        Breast expanded Panel  The Breast Expanded cancer panel is a genetic test which analyzes 18 genes known to be associated with an increased lifetime risk of breast and other cancers. For many of the genes, published medical guidelines exist for detection, prevention, risk reduction, and/or treatment.     Assessing Cancer Risk  Only about 5-10% of cancers are thought to be due to an inherited cancer susceptibility gene.    These families often have:    Several people with the same or related types of cancer    Cancers diagnosed at a young age (before age 50)    Individuals with more than one primary cancer    Multiple generations of the family affected with cancer    Some people may be candidates for genetic testing of more than one gene.  For these families, genetic testing using a multi-gene cancer panel may be offered.  These panels may test genes known to increase the risk for breast (and other) cancers: GILBERT, BRCA1, BRCA2, CDH1, CHEK2, PALB2, PTEN, and TP53.  The purpose of this handout is to serve as a brief summary of the high/moderate-risk breast cancer genes which have published clinical management guidelines for individuals who are found to carry a mutation.    BRCA1 and BRCA2-Hereditary Breast and Ovarian Cancer Syndrome (HBOC)  A single mutation in one of the copies of BRCA1 or BRCA2 increases the risk for breast and ovarian cancer, among others.  The risk for pancreatic cancer and melanoma may also be slightly  Pharmacy faxed in request for medication refill.     Medication: Albuterol HFA  Dosage: 90 MCG  Signature:  2 puffs every 4 hours as needed for shortness of breat, wheezing or cough  Quantity requested:  18 gm    Rx was preswcribed by Dayami Terry on 6/11/2019    Preferred pharmacy has been set up and verified      Preferred contact number#    Mobile:    Mobile [phone]          increased in some families.  The tables below list the chance that someone with a BRCA mutation would develop cancer in his or her lifetime1,2,3,4.          Women   Men     General Population BRCA+    General Population BRCA+   Breast  12% 40-80%  Breast <1% ~7%   Ovarian  1-2% 10-40%  Prostate 16% 20%             A person s ethnic background is also important to consider, as individuals of Ashkenazi Christian ancestry have a higher chance of having a BRCA gene mutation.  There are three BRCA mutations that occur more frequently in this population.     Individuals who inherit two BRCA2 mutations--one from their mother and one from their father--have a condition called Fanconi Anemia.  This rare autosomal recessive condition is associated with short stature, developmental delay, bone marrow failure, and increased risk for childhood cancers.    TP53-Li-Fraumeni Syndrome (LFS)  LFS is a cancer predisposition syndrome. Individuals with LFS are at an increased risk for developing cancer at a young age. The general lifetime risk for development of cancer is 50% by age 30 and 90% by age 60.  LFS is caused by a mutation in the TP53 gene.  A single mutation in one of the copies of TP53 increases the risk for multiple cancers.     Core Cancers: Sarcomas, Breast, Brain, Lung, Leukemias/Lymphomas, Adrenocortical carcinomas  Other Cancers: Gastrointestinal, Thyroid, Skin, Genitourinary    PTEN-Cowden Syndrome  Cowden syndrome is a hereditary condition that increases the risk for breast, thyroid, endometrial, and kidney cancer.  Cowden syndrome is caused by a mutation in the PTEN gene.  A single mutation in one of the copies of PTEN causes Cowden syndrome and increases cancer risk.  The table below shows the chance that someone with a PTEN mutation would develop cancer in their lifetime5,6.  Other benign features seen in some individuals with Cowden syndrome include benign skin lesions (facial papules, keratoses, lipomas), learning  disability, autism, thyroid nodules, colon polyps, and larger head size.      Lifetime Cancer Risk   Cancer Type General Population Cowden Syndrome   Breast  12% 25-50%*   Thyroid  1% 35%   Renal 1-2% 35%   Endometrial  2-3% 28%   Colon 5% 9%   Melanoma 2-3% >5%     *One recent study found breast cancer risk to be increased to 85%    CDH1-Hereditary Diffuse Gastric Cancer (HDGC)  Currently, one gene is known to cause hereditary diffuse gastric cancer: CDH1.  Individuals with HDGC are at increased risk for diffuse gastric cancer and lobular breast cancer. Of people diagnosed with HDGC, 30-50% have a mutation in the CDH1 gene.  This suggests there are likely other genes that may cause HDGC that have not been identified yet.      Lifetime Cancer Risks    General Pop HDGC    Diffuse Gastric  <1% ~80%   Breast 12% 39-52%       Additional Genes Associated with Increased Breast Cancer Risk  PALB2  Mutations in PALB2 have been shown to increase the risk of breast cancer up to 33-58% in some families; where individuals fall within this risk range is dependent upon family history.9 PALB2 mutations have also been associated with increased risk for pancreatic cancer, although this risk has not been quantified yet.  Individuals who inherit two PALB2 mutations--one from their mother and one from their father--have a condition called Fanconi Anemia.  This rare autosomal recessive condition is associated with short stature, developmental delay, bone marrow failure, and increased risk for childhood cancers.    GILBERT  GILBERT is a moderate-risk breast cancer gene. Women who have a mutation in GILBERT can have between a 2-4 fold increased risk for breast cancer compared to the general population.10  GILBERT mutations have also been associated with increased risk for pancreatic cancer, however an estimate of this cancer risk is not well understood.11  Individuals who inherit two GILBERT mutations have a condition called ataxia-telangiectasia (AT).  This  rare autosomal recessive condition affects the nervous system and immune system, and is associated with progressive cerebellar ataxia beginning in childhood.  Individuals with ataxia-telangiectasia often have a weakened immune system and have an increased risk for childhood cancers.         CHEK2   CHEK2 is a moderate-risk breast cancer gene.  Women who have a mutation in CHEK2 have around a 2-fold increased risk for breast cancer compared to the general population, and this risk may be higher depending upon family history.12,13,14 Mutations in CHEK2 have also been shown to increase the risk of a number of other cancers, including colon and prostate, however these cancer risks are currently not well understood.      Note that the table at the end of the handout includes additional moderate breast cancer genes.         Inheritance   All of the genes reviewed above are inherited in an autosomal dominant pattern.  This means that if a parent has a mutation, each of his or her children will have a 50% chance of inheriting that same mutation.  Therefore, each child--male or female--would have a 50% chance of being at increased risk for developing cancer.        Image obtained from Genetics Home Reference, 2013     In rare cases, there can be mutations in both copies of these genes (one from each parent), leading to different autosomal recessive conditions.  Mutations in some genes can occur de jules, which means that a person s mutation occurred for the first time in them and was not inherited from a parent.  Now that they have the mutation, however, it can be passed on to future generations.     Genetic Testing  Genetic testing involves a blood test and will look for any harmful mutations within genes that are associated with increased cancer risk.  If possible, it is recommended that the person(s) who has had cancer be tested before other family members.  That person will give us the most useful information about  whether or not a specific gene is associated with the cancer in the family.    Results  There are three possible results of genetic testing:    Positive--a harmful mutation was identified in one or more of the genes    Negative--no mutation was identified in any of the genes on this panel    Variant of unknown significance--a variation in one of the genes was identified, but it is unclear how this impacts cancer risk in the family      Advantages and Disadvantages  There are advantages and disadvantages to genetic testing.  Advantages    May clarify your cancer risk    Can help you make medical decisions    May explain the cancers in your family    May give useful information to your family members (if you share your results)    Disadvantages    Possible negative emotional impact of learning about inherited cancer risk    Uncertainty in interpreting a negative test result in some situations    Possible genetic discrimination concerns (see below)    Genetic Information Nondiscrimination Act (PREETI)  PREETI is a federal law that protects individuals from health insurance or employment discrimination based on a genetic test result alone.  Although rare, there are currently no legal discrimination protections in terms of life insurance, long term care, or disability insurances.  Visit the National Human Genome Research [place] genome.gov/45445717 to learn more.    Reducing Cancer Risk  Each of the genes listed within this handout have nationally recognized cancer screening guidelines that would be recommended for individuals who test positive.  In addition to increased cancer screening, surgeries may be offered or recommended to reduce cancer risk.  Recommendations are based upon an individual s genetic test result as well as their personal and family history of cancer.    Questions to Think About Regarding Genetic Testing    What effect will the test result have on me and my relationship with my family members if I have  an inherited gene mutation?  If I don t have a gene mutation?    Should I share my test results, and how will my family react to this news, which may also affect them?    Are my children ready to learn new information that may one day affect their own health?  Resources  FORCE: Facing Our Risk of Cancer Empowered facingourrisk.org   Bright Pink bebrightpink.[employer]   Li-Fraumeni Syndrome Association lfsassociation.org   PTEN World PTENworld.[employer]   No stomach for cancer, Inc. nostomachforcancer.org   Stomach cancer relief network scrnet.org   Collaborative Group of the Americas on Inherited Colorectal Cancer (CGA) cgaicc.com    Cancer Care cancercare.org   American Cancer Society (ACS) cancer.org   National Cancer [place] (NCI) cancer.gov     Cancer Risk Management Program [phone]-CANCER ([phone])  ? Shu Blaze, MS, [facility]  [phone]  ? Kateryna Tucker, MS, [facility]  [phone]  ? Kerrie Aleman, MS, [facility]  [phone]  ? Samaria Fajardo, MS, [facility]  [phone]  ? Magaly Awan, MS, [facility]  [phone]    References  1. Deirdre A, Tyrell PDP, Sonja S, Marybeth COLLAZO, Carloz JE, Jong JL, Milly N, Pamela H, Leeanne O, Tierra A, Nadege B, Amparo P, Manomega S, Keyur DM, Jacobo N, Reyes E, Meredith H, Yovany E, Lubinski J, Gronwald J, Yanely B, Castillo H, Thorlaci S, Eerola H, Cassius H, Rita K, Leeanna OP. Average risks of breast and ovarian cancer associated with BRCA1 or BRCA2 mutations detected in case series unselected for family history: a combined analysis of 222 studies. Am J Hum Keren. 2003;72:1117-30.  2. Bibi YANES, Amina BILLS, River G.  BRCA1 and BRCA2 Hereditary Breast and Ovarian Cancer. Gene Reviews online. 2013.  3. Dennis YC, Marlon S, Edis G, Guaman S. Breast cancer risk among male BRCA1 and BRCA2 mutation carriers. J Natl Cancer Inst. 2007;99:1811-4.  4. Landen BALL, Canelo I, Leo J, Noemy E, Chance SURESH, Junior SALINAS. Risk of breast cancer in male BRCA2 carriers. J Med Keren.  2010;47:710-1.  5. Sandro MH, Vane J, Ciaran J, Kasey LA, Solomon MS, Eng C. Lifetime cancer risks in individuals with germline PTEN mutations. Clin Cancer Res. 2012;18:400-7.  6. Shane RIOJAS. Cowden Syndrome: A Critical Review of the Clinical Literature. J Keren . 2009:18:13-27.  7. National Comprehensive Cancer Network. Clinical practice guidelines in oncology, colorectal cancer screening. Available online (registration required). 2013.  8. National Cancer [place]. SEER Cancer Stat Fact Sheets.  December 2013.  9. Deirdre JORDAN, et al. Breast-Cancer Risk in Families with Mutations in PALB2. NEJM. 2014; 371(6):497-506.  10. Gabriel A, Mehran D, Antoinette S, Ivy P, Javed T, Lacey M, Barney B, Austin H, Angel R, Jason K, Savanah L, Landen DG, Keyur D, Ja DF, Orin MR, The Breast Cancer Susceptibility Collaboration (UK) & Amilcar YANES. GILBERT mutations that cause ataxia-telangiectasia are breast cancer susceptibility alleles. Nature Genetics. 2006;38:873-875  11. Lyons N , Anibal Y, Kalani J, Lawanda L, Espostio GM , Bondy ML, Gallinger S, Pena AG, Syngal S, Ruslan ML, Mark J , Houston R, Rebecca SZ, Zoya JR, Debra VE, Altagracia M, Vogelstein B, Niecy N, Macho RH, Vinicius KW, and Angelo AP. GILBERT mutations in patients with hereditary pancreatic cancer. Cancer Discover. 2012;2:41-46  12. CHEK2 Breast Cancer Case-Control Consortium. CHEK2*1100delC and susceptibility to breast cancer: A collaborative analysis involving 10,860 breast cancer cases and 9,065 controls from 10 studies. Am J Hum Keren, 74 (2004), pp. 8926-3808  13. Moris T, Kari S, Kellie K, et al. Spectrum of Mutations in BRCA1, BRCA2, CHEK2, and TP53 in Families at High Risk of Breast Cancer. JUAN,2006;295(12):8923-8639.   14. Tiffanie URIAS, Damon FAY, Elaina CORNEJO, et al. Risk of breast cancer in women with a CHEK2 mutation with and without a family history of breast cancer. JClin Oncol. 2011;29:0290-0445.    Breast expanded  Panel    GENES (18) ASSOCIATED CANCER(S) ASSOCIATED CANCER SYNDROME(S)   GILBERT breast, pancreas    BARD1 breast, ovary    BRCA1 breast, ovary, pancreas, melanoma, prostate, male breast Hereditary breast /ovarian cancer syndrome (HBOC)   BRCA2 breast, ovary, pancreas, melanoma, prostate, male breast Hereditary breast /ovarian cancer syndrome (HBOC)   BRIP1 breast, ovary    CDH1 breast, stomach, colon Hereditary diffuse gastric cancer   CHEK2 breast, colon, prostate    MRE11A breast, ovary    MUTYH breast, colon MUTYH-associated polyposis (MAP)   NBN breast, ovary, prostate    NF1 breast, brain, peripheral nervous system Neurofibromatosis 1   PALB2 breast, pancreas    PTEN breast, thyroid, uterus, colon, kidney Cowden syndrome, Zluxxyqk-Icrlk-Rywvzpulq syndrome (BRRS), PTEN-related Proteus syndrome (PS), Proteus-like syndrome   RAD50 breast, ovary    RAD51C breast, ovary    RAD51D breast, ovary    STK11 breast, ovary, colon, pancreas, stomach, uterus, cervix Peutz-Jeghers syndrome   TP53 breast, bone, brain, soft tissues, adrenal cortex Li-Fraumeni syndrome           Turnaround Time  4 - 6 weeks    insurance coverage   Testing will be held until prior authorization is obtained. You will be contacted once prior authorization is completed.   About the [facility] ([facility]), [facility]  In collaborative effort with the [facility] and the [place] [employer] [facility] [employer], the MDL offers a full range of diagnostic testing services, with a strong focus on quality, accuracy, and timeliness.            Follow-ups after your visit        Your next 10 appointments already scheduled     May 19, 2017  1:15 PM [facility]   [facility] with  [facility]   [facility] ([facility])    [address] [place] [id number] [phone]              Future tests that were ordered for you today     Open  Future Orders        Priority Expected Expires Ordered    Next Generation Sequencing Routine  5/19/2018 5/19/2017            Who to contact     If you have questions or need follow up information about today's clinic visit or your schedule please contact [facility] directly at [phone].  Normal or non-critical lab and imaging results will be communicated to you by MyChart, letter or phone within 4 business days after the clinic has received the results. If you do not hear from us within 7 days, please contact the clinic through [employer] or phone. If you have a critical or abnormal lab result, we will notify you by phone as soon as possible.  Submit refill requests through [employer] or call your pharmacy and they will forward the refill request to us. Please allow 3 business days for your refill to be completed.          Additional Information About Your Visit        [employer] Information     [employer] gives you secure access to your electronic health record. If you see a primary care provider, you can also send messages to your care team and make appointments. If you have questions, please call your primary care clinic.  If you do not have a primary care provider, please call [phone] and they will assist you.        Care EveryWhere ID     This is your Care EveryWhere ID. This could be used by other organizations to access your [place] medical records  [id number]         Blood Pressure from Last 3 Encounters:   03/23/16 130/86   01/20/16 130/73    Weight from Last 3 Encounters:   03/23/16 61.9 kg (136 lb 8 oz)   01/20/16 57.6 kg (127 lb)               Primary Care Provider    No Pcp Confirmed       No address on file        Thank you!     Thank you for choosing [facility]  for your care. Our goal is always to provide you with excellent care. Hearing back from our patients is one way we can continue to improve our services. Please take a few minutes to complete the written  survey that you may receive in the mail after your visit with us. Thank you!             Your Updated Medication List - Protect others around you: Learn how to safely use, store and throw away your medicines at www.disposemymeds.org.          This list is accurate as of: 5/19/17 12:54 PM.  Always use your most recent med list.                   Brand Name Dispense Instructions for use    UNKNOWN TO PATIENT      Birth Control

## 2020-12-27 ENCOUNTER — HEALTH MAINTENANCE LETTER (OUTPATIENT)
Age: 30
End: 2020-12-27

## 2021-04-24 ENCOUNTER — HEALTH MAINTENANCE LETTER (OUTPATIENT)
Age: 31
End: 2021-04-24

## 2021-10-04 ENCOUNTER — HEALTH MAINTENANCE LETTER (OUTPATIENT)
Age: 31
End: 2021-10-04

## 2022-05-15 ENCOUNTER — HEALTH MAINTENANCE LETTER (OUTPATIENT)
Age: 32
End: 2022-05-15

## 2022-09-11 ENCOUNTER — HEALTH MAINTENANCE LETTER (OUTPATIENT)
Age: 32
End: 2022-09-11

## 2023-06-03 ENCOUNTER — HEALTH MAINTENANCE LETTER (OUTPATIENT)
Age: 33
End: 2023-06-03

## 2023-10-01 ENCOUNTER — TRANSFERRED RECORDS (OUTPATIENT)
Dept: MULTI SPECIALTY CLINIC | Facility: CLINIC | Age: 33
End: 2023-10-01

## 2023-10-01 LAB — PAP SMEAR - HIM PATIENT REPORTED: NORMAL

## 2023-10-31 NOTE — PROGRESS NOTES
{PROVIDER CHARTING PREFERENCE:580569}    Verito Bell is a 33 year old, presenting for the following health issues:  No chief complaint on file.  {(!) Visit Details have not yet been documented.  Please enter Visit Details and then use this list to pull in documentation. (Optional):767789}    HPI     {MA/LPN/RN Pre-Provider Visit Orders- hCG/UA/Strep (Optional):858577}  {SUPERLIST (Optional):125045}  {additonal problems for provider to add (Optional):539347}      Review of Systems   {ROS COMP (Optional):407634}      Objective    There were no vitals taken for this visit.  There is no height or weight on file to calculate BMI.  Physical Exam   {Exam List (Optional):531007}    {Diagnostic Test Results (Optional):906135}    {AMBULATORY ATTESTATION (Optional):228113}

## 2023-11-01 ENCOUNTER — OFFICE VISIT (OUTPATIENT)
Dept: FAMILY MEDICINE | Facility: CLINIC | Age: 33
End: 2023-11-01
Payer: COMMERCIAL

## 2023-11-01 VITALS
DIASTOLIC BLOOD PRESSURE: 82 MMHG | SYSTOLIC BLOOD PRESSURE: 119 MMHG | BODY MASS INDEX: 19.7 KG/M2 | HEIGHT: 69 IN | HEART RATE: 84 BPM | WEIGHT: 133 LBS | RESPIRATION RATE: 14 BRPM | OXYGEN SATURATION: 100 % | TEMPERATURE: 98 F

## 2023-11-01 DIAGNOSIS — J45.20 MILD INTERMITTENT ASTHMA WITHOUT COMPLICATION: ICD-10-CM

## 2023-11-01 DIAGNOSIS — K40.90 LEFT INGUINAL HERNIA: ICD-10-CM

## 2023-11-01 DIAGNOSIS — Z00.00 ROUTINE GENERAL MEDICAL EXAMINATION AT A HEALTH CARE FACILITY: Primary | ICD-10-CM

## 2023-11-01 DIAGNOSIS — Z11.59 NEED FOR HEPATITIS C SCREENING TEST: ICD-10-CM

## 2023-11-01 DIAGNOSIS — Z11.4 SCREENING FOR HIV (HUMAN IMMUNODEFICIENCY VIRUS): ICD-10-CM

## 2023-11-01 PROCEDURE — 99385 PREV VISIT NEW AGE 18-39: CPT | Performed by: INTERNAL MEDICINE

## 2023-11-01 RX ORDER — ALBUTEROL SULFATE 90 UG/1
2 AEROSOL, METERED RESPIRATORY (INHALATION) EVERY 4 HOURS PRN
Qty: 18 G | Refills: 3 | Status: SHIPPED | OUTPATIENT
Start: 2023-11-01

## 2023-11-01 RX ORDER — TRETINOIN 0.5 MG/G
CREAM TOPICAL
COMMUNITY

## 2023-11-01 ASSESSMENT — PAIN SCALES - GENERAL: PAINLEVEL: NO PAIN (0)

## 2023-11-01 NOTE — PROGRESS NOTES
SUBJECTIVE:   CC: Arabella is an 33 year old who presents for preventive health visit.     Healthy Habits:     Getting at least 3 servings of Calcium per day:  Yes    Bi-annual eye exam:  NO    Dental care twice a year:  Yes    Sleep apnea or symptoms of sleep apnea:  None    Diet:  Vegetarian/vegan    Frequency of exercise:  4-5 days/week    Duration of exercise:  45-60 minutes    Taking medications regularly:  Yes    Barriers to taking medications:  None    Medication side effects:  None    Additional concerns today:  No  History of Present Illness       Reason for visit:  Establish primary care and schedule pre-op appt    She eats 4 or more servings of fruits and vegetables daily.She consumes 0 sweetened beverage(s) daily.She exercises with enough effort to increase her heart rate 60 or more minutes per day.  She exercises with enough effort to increase her heart rate 6 days per week.   She is not taking prescribed medications regularly due to None.          Have you ever done Advance Care Planning? (For example, a Health Directive, POLST, or a discussion with a medical provider or your loved ones about your wishes): No, advance care planning information given to patient to review.  Patient declined advance care planning discussion at this time.    Social History     Tobacco Use    Smoking status: Never    Smokeless tobacco: Never   Substance Use Topics    Alcohol use: Yes     Alcohol/week: 1.0 standard drink of alcohol     Types: 1 Standard drinks or equivalent per week              No data to display                   No data to display              Reviewed orders with patient.  Reviewed health maintenance and updated orders accordingly - Yes  Lab work is in process  Labs reviewed in EPIC    Breast Cancer Screening:    FHS-7:        No data to display                  Pertinent mammograms are reviewed under the imaging tab.    History of abnormal Pap smear: NO - age 30- 65 PAP every 3 years recommended    "  Reviewed and updated as needed this visit by clinical staff                  Reviewed and updated as needed this visit by Provider                 Past Medical History:   Diagnosis Date    Anemia     Overview:   1/12 hgb 11    Lyme arthritis of knee (H)     Overview:   -Follows with Dr. Matt Wilson and Dr. Gopal Nazario  -Left knee lyme arthritis diagnosed with positive PCR on synovial fluid along with positive serologies late October 2015. Completed 24 days of PO Doxy followed by IV ceftriaxone for 28 days  -Issues with tachycardia and fever thought secondary to Herxheimer Reaction. Resolved on Prednisone taper.    Lyme disease 2013        Review of Systems  CONSTITUTIONAL: NEGATIVE for fever, chills, change in weight  INTEGUMENTARU/SKIN: NEGATIVE for worrisome rashes   EYES: NEGATIVE for vision changes or irritation  ENT: NEGATIVE for ear, mouth and throat problems  RESP: NEGATIVE for significant cough or SOB  BREAST: NEGATIVE for masses, tenderness or discharge  CV: NEGATIVE for chest pain, palpitations or peripheral edema  GI: NEGATIVE for nausea, abdominal pain, heartburn, or change in bowel habits  : NEGATIVE for unusual urinary or vaginal symptoms. Periods are regular.  MUSCULOSKELETAL: NEGATIVE for significant arthralgias or myalgia  NEURO: NEGATIVE for weakness, dizziness or paresthesias  PSYCHIATRIC: NEGATIVE for changes in mood or affect     OBJECTIVE:   /82   Pulse 84   Temp 98  F (36.7  C) (Tympanic)   Resp 14   Ht 1.753 m (5' 9\")   Wt 60.3 kg (133 lb)   LMP 10/25/2023 (Approximate)   SpO2 100%   BMI 19.64 kg/m    Physical Exam  GENERAL: healthy, alert and no distress  EYES: Eyes grossly normal to inspection, PERRL and conjunctivae and sclerae normal  HENT: ear canals and TM's normal, nose and mouth without ulcers or lesions  NECK: no adenopathy, no asymmetry, masses, or scars and thyroid normal to palpation  RESP: lungs clear to auscultation - no rales, rhonchi or wheezes  CV: " regular rate and rhythm, normal S1 S2, no S3 or S4, no murmur, click or rub, no peripheral edema    ABDOMEN: soft, nontender, no hepatosplenomegaly, no masses and bowel sounds normal  MS: no gross musculoskeletal defects noted, no edema  SKIN: no suspicious lesions or rashes  NEURO: Normal strength and tone, mentation intact and speech normal  PSYCH: mentation appears normal, affect normal/bright    Diagnostic Test Results:  Labs reviewed in Epic    ASSESSMENT/PLAN:     Patient Instructions   (Z00.00) Routine general medical examination at a health care facility  (primary encounter diagnosis)  Comment: For routine exam, we will draw labs as ordered, cholesterol, diabetes mellitus check, liver function, renal function.  We will also update vaccination history.   Plan: Lipid panel reflex to direct LDL Fasting,         Comprehensive metabolic panel, CBC with         platelets            (Z11.4) Screening for HIV (human immunodeficiency virus)  Comment: We will screen HIV  Plan: HIV Antigen Antibody Combo            (Z11.59) Need for hepatitis C screening test  Comment: We will screen Hepatitis C as well  Plan: Hepatitis C Screen Reflex to HCV RNA Quant and         Genotype            (J45.20) Mild intermittent asthma without complication  Comment: OK to refill albuterol   Plan: albuterol (PROAIR HFA/PROVENTIL HFA/VENTOLIN         HFA) 108 (90 Base) MCG/ACT inhaler            (K40.90) Left inguinal hernia  Comment: Plan for surgery  Plan:         Patient has been advised of split billing requirements and indicates understanding: Yes      COUNSELING:  Reviewed preventive health counseling, as reflected in patient instructions        She reports that she has never smoked. She has never used smokeless tobacco.          Phillip Bradshaw MD, MD  [facility]

## 2023-11-01 NOTE — PATIENT INSTRUCTIONS
(Z00.00) Routine general medical examination at a health care facility  (primary encounter diagnosis)  Comment: For routine exam, we will draw labs as ordered, cholesterol, diabetes mellitus check, liver function, renal function.  We will also update vaccination history.   Plan: Lipid panel reflex to direct LDL Fasting,         Comprehensive metabolic panel, CBC with         platelets            (Z11.4) Screening for HIV (human immunodeficiency virus)  Comment: We will screen HIV  Plan: HIV Antigen Antibody Combo            (Z11.59) Need for hepatitis C screening test  Comment: We will screen Hepatitis C as well  Plan: Hepatitis C Screen Reflex to HCV RNA Quant and         Genotype            (J45.20) Mild intermittent asthma without complication  Comment: OK to refill albuterol   Plan: albuterol (PROAIR HFA/PROVENTIL HFA/VENTOLIN         HFA) 108 (90 Base) MCG/ACT inhaler            (K40.90) Left inguinal hernia  Comment: Plan for surgery  Plan:

## 2023-11-02 ENCOUNTER — LAB (OUTPATIENT)
Dept: LAB | Facility: CLINIC | Age: 33
End: 2023-11-02
Payer: COMMERCIAL

## 2023-11-02 DIAGNOSIS — Z11.4 SCREENING FOR HIV (HUMAN IMMUNODEFICIENCY VIRUS): ICD-10-CM

## 2023-11-02 DIAGNOSIS — Z11.59 NEED FOR HEPATITIS C SCREENING TEST: ICD-10-CM

## 2023-11-02 DIAGNOSIS — Z00.00 ROUTINE GENERAL MEDICAL EXAMINATION AT A HEALTH CARE FACILITY: ICD-10-CM

## 2023-11-02 LAB
ALBUMIN SERPL BCG-MCNC: 4.2 G/DL (ref 3.5–5.2)
ALP SERPL-CCNC: 62 U/L (ref 35–104)
ALT SERPL W P-5'-P-CCNC: 28 U/L (ref 0–50)
ANION GAP SERPL CALCULATED.3IONS-SCNC: 10 MMOL/L (ref 7–15)
AST SERPL W P-5'-P-CCNC: 21 U/L (ref 0–45)
BILIRUB SERPL-MCNC: 0.3 MG/DL
BUN SERPL-MCNC: 14.8 MG/DL (ref 6–20)
CALCIUM SERPL-MCNC: 9.3 MG/DL (ref 8.6–10)
CHLORIDE SERPL-SCNC: 105 MMOL/L (ref 98–107)
CHOLEST SERPL-MCNC: 169 MG/DL
CREAT SERPL-MCNC: 0.73 MG/DL (ref 0.51–0.95)
DEPRECATED HCO3 PLAS-SCNC: 25 MMOL/L (ref 22–29)
EGFRCR SERPLBLD CKD-EPI 2021: >90 ML/MIN/1.73M2
ERYTHROCYTE [DISTWIDTH] IN BLOOD BY AUTOMATED COUNT: 12.1 % (ref 10–15)
GLUCOSE SERPL-MCNC: 98 MG/DL (ref 70–99)
HCT VFR BLD AUTO: 42.1 % (ref 35–47)
HCV AB SERPL QL IA: NONREACTIVE
HDLC SERPL-MCNC: 91 MG/DL
HGB BLD-MCNC: 13.3 G/DL (ref 11.7–15.7)
HIV 1+2 AB+HIV1 P24 AG SERPL QL IA: NONREACTIVE
LDLC SERPL CALC-MCNC: 69 MG/DL
MCH RBC QN AUTO: 28.3 PG (ref 26.5–33)
MCHC RBC AUTO-ENTMCNC: 31.6 G/DL (ref 31.5–36.5)
MCV RBC AUTO: 90 FL (ref 78–100)
NONHDLC SERPL-MCNC: 78 MG/DL
PLATELET # BLD AUTO: 223 10E3/UL (ref 150–450)
POTASSIUM SERPL-SCNC: 4.4 MMOL/L (ref 3.4–5.3)
PROT SERPL-MCNC: 7.3 G/DL (ref 6.4–8.3)
RBC # BLD AUTO: 4.7 10E6/UL (ref 3.8–5.2)
SODIUM SERPL-SCNC: 140 MMOL/L (ref 135–145)
TRIGL SERPL-MCNC: 43 MG/DL
WBC # BLD AUTO: 4.1 10E3/UL (ref 4–11)

## 2023-11-02 PROCEDURE — 36415 COLL VENOUS BLD VENIPUNCTURE: CPT

## 2023-11-02 PROCEDURE — 80061 LIPID PANEL: CPT

## 2023-11-02 PROCEDURE — 80053 COMPREHEN METABOLIC PANEL: CPT

## 2023-11-02 PROCEDURE — 87389 HIV-1 AG W/HIV-1&-2 AB AG IA: CPT

## 2023-11-02 PROCEDURE — 85027 COMPLETE CBC AUTOMATED: CPT

## 2023-11-02 PROCEDURE — 86803 HEPATITIS C AB TEST: CPT

## 2023-11-03 NOTE — RESULT ENCOUNTER NOTE
Talon Bell,    I had the opportunity to review your recent labs and a summary of your labs reads as follows:    Your complete blood counts show no sign of anemia, normal white blood cell count and platelets.  Your comprehensive metabolic panel showed normal renal function, normal liver function, and normal fasting blood glucose indicating no evidence of diabetes mellitus.  Your fasting lipid panel show  - normal HDL (good) cholesterol -as your goal is greater than 40  - low LDL (bad) cholesterol as your goal is less than 160  - normal triglyceride levels  Your screening test for HIV and hepatitis C returned negative as well    Congratulations on your excellent results         Sincerely,  Phillip Bradshaw MD

## 2023-12-11 NOTE — PROGRESS NOTES
[address], [address]  [facility] [address]  Phone: [phone]  Primary Provider: Phillip Bradshaw  Pre-op Performing Provider: PRIYANKA HAWTHORNE PA-C      PREOPERATIVE EVALUATION:  Today's date: 12/12/2023    Arabella is a 33 year old, presenting for the following:  Pre-Op Exam        Surgical Information:  Surgery/Procedure: Robotic assist laparoscopic repair of left inguinal hernia with mesh, possible right   Surgery Location: [facility]  Surgeon: Stephen Walls MD   Surgery Date: 12/29/2023  Time of Surgery: 7:30 am  Where patient plans to recover: At home with family  Fax number for surgical facility: [phone]    Assessment & Plan     The proposed surgical procedure is considered INTERMEDIATE risk.    Left inguinal hernia  LMP 11/24/23  Pt has rare episodes of SVT, but not for months    Preop general physical exam  Cleared pt for surgery as planned    Mild intermittent asthma without complication  - well controlled, sensitive to smells and dust  - rarely uses albuterol (a few times per year)            - No identified additional risk factors other than previously addressed    Antiplatelet or Anticoagulation Medication Instruction:  Pt advised to stop her fish oil and ibuprofen now for upcoming surgery    Additional Medication Instructions:  Patient is on no additional chronic medications    RECOMMENDATION:  APPROVAL GIVEN to proceed with proposed procedure, without further diagnostic evaluation.            Subjective       HPI related to upcoming procedure: symptomatic L inguinal hernia        12/10/2023    10:48 AM   Preop Questions   1. Have you ever had a heart attack or stroke? No   2. Have you ever had surgery on your heart or blood vessels, such as a stent placement, a coronary artery bypass, or surgery on an artery in your head, neck, heart, or legs? No   3. Do you have chest pain with activity? No   4. Do you have a history of  heart  failure? No   5. Do you currently have a cold, bronchitis or symptoms of other infection? No   6. Do you have a cough, shortness of breath, or wheezing? No   7. Do you or anyone in your family have previous history of blood clots? No   8. Do you or does anyone in your family have a serious bleeding problem such as prolonged bleeding following surgeries or cuts? No   9. Have you ever had problems with anemia or been told to take iron pills? YES - remote history    10. Have you had any abnormal blood loss such as black, tarry or bloody stools, or abnormal vaginal bleeding? YES - Hx of rectal bleeding, work up neg   11. Have you ever had a blood transfusion? No   12. Are you willing to have a blood transfusion if it is medically needed before, during, or after your surgery? Yes   13. Have you or any of your relatives ever had problems with anesthesia? YES - FH of trouble coming out of sedation (MGF)   14. Do you have sleep apnea, excessive snoring or daytime drowsiness? No   15. Do you have any artifical heart valves or other implanted medical devices like a pacemaker, defibrillator, or continuous glucose monitor? No   16. Do you have artificial joints? No   17. Are you allergic to latex? No   18. Is there any chance that you may be pregnant? No       Review of Systems  CONSTITUTIONAL: NEGATIVE for fever, chills, change in weight  INTEGUMENTARY/SKIN: NEGATIVE for worrisome rashes, moles or lesions  EYES: NEGATIVE for vision changes or irritation  ENT/MOUTH: NEGATIVE for ear, mouth and throat problems  RESP: NEGATIVE for significant cough or SOB  CV: NEGATIVE for chest pain, palpitations or peripheral edema  GI: NEGATIVE for nausea, abdominal pain, heartburn, or change in bowel habits  : NEGATIVE for frequency, dysuria, or hematuria  MUSCULOSKELETAL: NEGATIVE for significant arthralgias or myalgia  NEURO: NEGATIVE for weakness, dizziness or paresthesias  ENDOCRINE: NEGATIVE for temperature intolerance, skin/hair  changes  HEME: NEGATIVE for bleeding problems  PSYCHIATRIC: NEGATIVE for changes in mood or affect    Patient Active Problem List    Diagnosis Date Noted    Left inguinal hernia 11/01/2023     Priority: Medium    Allergic rhinitis 11/12/2017     Priority: Medium    SVT (supraventricular tachycardia) 01/06/2016     Priority: Medium    Asthma with severity to be determined 01/11/1999     Priority: Medium     Overview:   ICD-10 update of inactive term        Past Medical History:   Diagnosis Date    Anemia     Overview:   1/12 hgb 11    Lyme arthritis of knee (H)     Overview:   -Follows with Dr. Matt Wilson and Dr. Gopal Nazario  -Left knee lyme arthritis diagnosed with positive PCR on synovial fluid along with positive serologies late October 2015. Completed 24 days of PO Doxy followed by IV ceftriaxone for 28 days  -Issues with tachycardia and fever thought secondary to Herxheimer Reaction. Resolved on Prednisone taper.    Lyme disease 2013    Uncomplicated asthma 1996     Past Surgical History:   Procedure Laterality Date    CHOLECYSTECTOMY  2016    CHOLECYSTOSTOMY  2016    HC TOOTH EXTRACTION W/FORCEP      ORTHOPEDIC SURGERY  2018    left hip labral repair     Current Outpatient Medications   Medication Sig Dispense Refill    albuterol (PROAIR HFA/PROVENTIL HFA/VENTOLIN HFA) 108 (90 Base) MCG/ACT inhaler Inhale 2 puffs into the lungs every 4 hours as needed for shortness of breath, wheezing or cough 18 g 3    multivitamin, therapeutic with minerals (MULTI-VITAMIN) TABS tablet Take 1 tablet by mouth daily      Omega-3 Fatty Acids (FISH OIL CONCENTRATE PO)       Probiotic Product (PROBIOTIC ADVANCED PO)       tretinoin (RETIN-A) 0.05 % external cream       vitamin B complex with vitamin C (VITAMIN  B COMPLEX) TABS tablet Take 1 tablet by mouth daily         Allergies   Allergen Reactions    Covid-19 Ad26 Vaccine([employer]) Other (See Comments)     Superficial thrombophlebitis    Succinylcholine Other (See Comments)  "    Runs in the family-Patient states it causes difficulty waking up from anest        Social History     Tobacco Use    Smoking status: Never    Smokeless tobacco: Never   Substance Use Topics    Alcohol use: Yes     Alcohol/week: 1.0 standard drink of alcohol     Types: 1 Standard drinks or equivalent per week     Family History   Problem Relation Age of Onset    Hypertension Mother     Vasculitis Father     Breast Cancer Paternal Grandmother 60         at 67    Breast Cancer Maternal Aunt 45        maternal great aunt    Breast Cancer Paternal Aunt 49         at 49    Breast Cancer Paternal Aunt 65         at 69    Breast Cancer Cousin 40        maternal first cousin    Cervical Cancer Cousin 41    Pancreatic Cancer Other 79        maternal great granmother    Breast Cancer Other         paternal great aunt    Prostate Cancer Other 75        paternal great grandfather;  at 76    Autoimmune Disease No family hx of      History   Drug Use No         Objective     /82 (BP Location: Right arm, Patient Position: Sitting, Cuff Size: Adult Regular)   Pulse 76   Temp 97.8  F (36.6  C) (Temporal)   Resp 16   Ht 1.753 m (5' 9\")   Wt 62.2 kg (137 lb 1.6 oz)   LMP 2023 (Exact Date)   SpO2 100%   BMI 20.25 kg/m      Physical Exam  GENERAL APPEARANCE: healthy, alert and no distress  HENT: ear canals and TM's normal and nose and mouth without ulcers or lesions  RESP: lungs clear to auscultation - no rales, rhonchi or wheezes  CV: regular rate and rhythm, normal S1 S2, no S3 or S4 and no murmur, click or rub   ABDOMEN: soft, nontender, no HSM or masses and bowel sounds normal  NEURO: Normal strength and tone, sensory exam grossly normal, mentation intact and speech normal    Recent Labs   Lab Test 23  0756   HGB 13.3         POTASSIUM 4.4   CR 0.73        Diagnostics:  No labs were ordered during this visit.   No EKG required, no history of coronary heart disease, " significant arrhythmia, peripheral arterial disease or other structural heart disease.    Revised Cardiac Risk Index (RCRI):  The patient has the following serious cardiovascular risks for perioperative complications:   - No serious cardiac risks = 0 points     RCRI Interpretation: 0 points: Class I (very low risk - 0.4% complication rate)         Signed Electronically by: Shereen Mazariegos PA-C  Copy of this evaluation report is provided to requesting physician.

## 2023-12-12 ENCOUNTER — OFFICE VISIT (OUTPATIENT)
Dept: FAMILY MEDICINE | Facility: CLINIC | Age: 33
End: 2023-12-12
Payer: COMMERCIAL

## 2023-12-12 VITALS
SYSTOLIC BLOOD PRESSURE: 115 MMHG | DIASTOLIC BLOOD PRESSURE: 82 MMHG | HEIGHT: 69 IN | TEMPERATURE: 97.8 F | WEIGHT: 137.1 LBS | BODY MASS INDEX: 20.31 KG/M2 | OXYGEN SATURATION: 100 % | RESPIRATION RATE: 16 BRPM | HEART RATE: 76 BPM

## 2023-12-12 DIAGNOSIS — Z01.818 PREOP GENERAL PHYSICAL EXAM: ICD-10-CM

## 2023-12-12 DIAGNOSIS — K40.90 LEFT INGUINAL HERNIA: Primary | ICD-10-CM

## 2023-12-12 DIAGNOSIS — J45.20 MILD INTERMITTENT ASTHMA WITHOUT COMPLICATION: ICD-10-CM

## 2023-12-12 PROCEDURE — 99214 OFFICE O/P EST MOD 30 MIN: CPT | Performed by: PHYSICIAN ASSISTANT

## 2023-12-12 ASSESSMENT — PAIN SCALES - GENERAL: PAINLEVEL: NO PAIN (0)

## 2024-04-19 ENCOUNTER — TELEPHONE (OUTPATIENT)
Dept: FAMILY MEDICINE | Facility: CLINIC | Age: 34
End: 2024-04-19

## 2024-04-19 ENCOUNTER — OFFICE VISIT (OUTPATIENT)
Dept: FAMILY MEDICINE | Facility: CLINIC | Age: 34
End: 2024-04-19
Payer: COMMERCIAL

## 2024-04-19 VITALS
BODY MASS INDEX: 20.71 KG/M2 | SYSTOLIC BLOOD PRESSURE: 122 MMHG | DIASTOLIC BLOOD PRESSURE: 81 MMHG | HEIGHT: 69 IN | WEIGHT: 139.8 LBS | RESPIRATION RATE: 18 BRPM | TEMPERATURE: 99 F | OXYGEN SATURATION: 99 % | HEART RATE: 116 BPM

## 2024-04-19 DIAGNOSIS — R52 BODY ACHES: Primary | ICD-10-CM

## 2024-04-19 DIAGNOSIS — R53.83 OTHER FATIGUE: Primary | ICD-10-CM

## 2024-04-19 DIAGNOSIS — R53.83 OTHER FATIGUE: ICD-10-CM

## 2024-04-19 LAB
ALBUMIN UR-MCNC: NEGATIVE MG/DL
APPEARANCE UR: CLEAR
BACTERIA #/AREA URNS HPF: ABNORMAL /HPF
BILIRUB UR QL STRIP: NEGATIVE
COLOR UR AUTO: YELLOW
ERYTHROCYTE [DISTWIDTH] IN BLOOD BY AUTOMATED COUNT: 12.4 % (ref 10–15)
GLUCOSE UR STRIP-MCNC: NEGATIVE MG/DL
HCG UR QL: NEGATIVE
HCT VFR BLD AUTO: 40.7 % (ref 35–47)
HGB BLD-MCNC: 13.8 G/DL (ref 11.7–15.7)
HGB UR QL STRIP: ABNORMAL
KETONES UR STRIP-MCNC: NEGATIVE MG/DL
LEUKOCYTE ESTERASE UR QL STRIP: NEGATIVE
MCH RBC QN AUTO: 29.4 PG (ref 26.5–33)
MCHC RBC AUTO-ENTMCNC: 33.9 G/DL (ref 31.5–36.5)
MCV RBC AUTO: 87 FL (ref 78–100)
NITRATE UR QL: NEGATIVE
PH UR STRIP: 6 [PH] (ref 5–7)
PLATELET # BLD AUTO: 198 10E3/UL (ref 150–450)
RBC # BLD AUTO: 4.69 10E6/UL (ref 3.8–5.2)
RBC #/AREA URNS AUTO: ABNORMAL /HPF
SP GR UR STRIP: 1.01 (ref 1–1.03)
SQUAMOUS #/AREA URNS AUTO: ABNORMAL /LPF
UROBILINOGEN UR STRIP-ACNC: 0.2 E.U./DL
WBC # BLD AUTO: 5.3 10E3/UL (ref 4–11)
WBC #/AREA URNS AUTO: ABNORMAL /HPF

## 2024-04-19 PROCEDURE — 86618 LYME DISEASE ANTIBODY: CPT | Performed by: FAMILY MEDICINE

## 2024-04-19 PROCEDURE — 85027 COMPLETE CBC AUTOMATED: CPT | Performed by: FAMILY MEDICINE

## 2024-04-19 PROCEDURE — 82550 ASSAY OF CK (CPK): CPT | Performed by: FAMILY MEDICINE

## 2024-04-19 PROCEDURE — 81025 URINE PREGNANCY TEST: CPT | Performed by: FAMILY MEDICINE

## 2024-04-19 PROCEDURE — 86617 LYME DISEASE ANTIBODY: CPT | Performed by: FAMILY MEDICINE

## 2024-04-19 PROCEDURE — G2211 COMPLEX E/M VISIT ADD ON: HCPCS | Performed by: FAMILY MEDICINE

## 2024-04-19 PROCEDURE — 36415 COLL VENOUS BLD VENIPUNCTURE: CPT | Performed by: FAMILY MEDICINE

## 2024-04-19 PROCEDURE — 86431 RHEUMATOID FACTOR QUANT: CPT | Performed by: FAMILY MEDICINE

## 2024-04-19 PROCEDURE — 84443 ASSAY THYROID STIM HORMONE: CPT | Performed by: FAMILY MEDICINE

## 2024-04-19 PROCEDURE — 80053 COMPREHEN METABOLIC PANEL: CPT | Performed by: FAMILY MEDICINE

## 2024-04-19 PROCEDURE — 86140 C-REACTIVE PROTEIN: CPT | Performed by: FAMILY MEDICINE

## 2024-04-19 PROCEDURE — 81001 URINALYSIS AUTO W/SCOPE: CPT | Performed by: FAMILY MEDICINE

## 2024-04-19 PROCEDURE — 87086 URINE CULTURE/COLONY COUNT: CPT | Performed by: FAMILY MEDICINE

## 2024-04-19 PROCEDURE — 86038 ANTINUCLEAR ANTIBODIES: CPT | Performed by: FAMILY MEDICINE

## 2024-04-19 PROCEDURE — 99214 OFFICE O/P EST MOD 30 MIN: CPT | Performed by: FAMILY MEDICINE

## 2024-04-19 PROCEDURE — 82306 VITAMIN D 25 HYDROXY: CPT | Performed by: FAMILY MEDICINE

## 2024-04-19 ASSESSMENT — ENCOUNTER SYMPTOMS
FATIGUE: 1
FEVER: 1

## 2024-04-19 ASSESSMENT — ASTHMA QUESTIONNAIRES
QUESTION_2 LAST FOUR WEEKS HOW OFTEN HAVE YOU HAD SHORTNESS OF BREATH: NOT AT ALL
QUESTION_5 LAST FOUR WEEKS HOW WOULD YOU RATE YOUR ASTHMA CONTROL: COMPLETELY CONTROLLED
ACT_TOTALSCORE: 25
ACT_TOTALSCORE: 25
QUESTION_3 LAST FOUR WEEKS HOW OFTEN DID YOUR ASTHMA SYMPTOMS (WHEEZING, COUGHING, SHORTNESS OF BREATH, CHEST TIGHTNESS OR PAIN) WAKE YOU UP AT NIGHT OR EARLIER THAN USUAL IN THE MORNING: NOT AT ALL
QUESTION_4 LAST FOUR WEEKS HOW OFTEN HAVE YOU USED YOUR RESCUE INHALER OR NEBULIZER MEDICATION (SUCH AS ALBUTEROL): NOT AT ALL
QUESTION_1 LAST FOUR WEEKS HOW MUCH OF THE TIME DID YOUR ASTHMA KEEP YOU FROM GETTING AS MUCH DONE AT WORK, SCHOOL OR AT HOME: NONE OF THE TIME

## 2024-04-19 ASSESSMENT — PAIN SCALES - GENERAL: PAINLEVEL: MILD PAIN (2)

## 2024-04-19 NOTE — TELEPHONE ENCOUNTER
Patient has seen results of urinalysis. Patient is asking if culture can be ordered. States that her father has history of recurring UTI and difficulty getting rid of them.     Patient is hoping to receive treatment for UTI today. Symptoms are worsening.  Sofi Harding RN

## 2024-04-19 NOTE — PROGRESS NOTES
Assessment & Plan     Body aches  Patient is having nonspecific symptoms of bodyaches fatigue and fever like feeling.  Exact etiology of symptoms not clear.  She contributed some of those symptoms are more marked after she had a surgery done however surgery was done few months ago.  Not sure if there is any inflammation.  Will get a battery of test and see if we can find any specific reason for that.  Warning signs were discussed with the patient if any focal tenderness worsening symptoms I want her to go to the ER for evaluation.  Meanwhile if labs are stable I would suggest maybe getting some imaging of abdomen done to make sure there is no other occult issue going on.  - CBC with platelets; Future  - Comprehensive metabolic panel (BMP + Alb, Alk Phos, ALT, AST, Total. Bili, TP); Future  - UA with Microscopic reflex to Culture - lab collect; Future  - HCG Qual, Urine ([id number]); Future  - TSH with free T4 reflex; Future  - Anti Nuclear Erica IgG by IFA with Reflex; Future  - Rheumatoid factor; Future  - CRP, inflammation; Future  - CK total; Future  - Vitamin D Deficiency; Future  - Lyme Disease Total Abs Bld with Reflex to Confirm CLIA; Future  - CBC with platelets  - Comprehensive metabolic panel (BMP + Alb, Alk Phos, ALT, AST, Total. Bili, TP)  - UA with Microscopic reflex to Culture - lab collect  - HCG Qual, Urine ([id number])  - TSH with free T4 reflex  - Anti Nuclear Erica IgG by IFA with Reflex  - Rheumatoid factor  - CRP, inflammation  - CK total  - Vitamin D Deficiency  - Lyme Disease Total Abs Bld with Reflex to Confirm CLIA    Other fatigue    - CBC with platelets; Future  - TSH with free T4 reflex; Future  - Rheumatoid factor; Future  - CRP, inflammation; Future  - CK total; Future  - Vitamin D Deficiency; Future  - CBC with platelets  - TSH with free T4 reflex  - Rheumatoid factor  - CRP, inflammation  - CK total  - Vitamin D Deficiency    The longitudinal plan of care for the diagnosis(es)/condition(s) as  documented were addressed during this visit. Due to the added complexity in care, I will continue to support Arabella in the subsequent management and with ongoing continuity of care.            Subjective   Arabella is a 33 year old, presenting for the following health issues:  Fever (2 days), Fatigue (2 days), Hospital F/U (Post hernia surgery 12/29/2023, some discomfort on closure area, 2 score pain incision, no redness or discharge per pt), Generalized Body Aches, and Palpitations (With apple watch get high HR reading even when sleeping , 2 days)        4/19/2024    11:12 AM   Additional Questions   Roomed by Eulogio   Accompanied by Not applicable, by themselves     Via the Health Maintenance questionnaire, the patient has reported the following services have been completed -Cervical Cancer Screening, this information has been sent to the abstraction team.  History of Present Illness       Reason for visit:  Low-grade fever, aches, chills, fatigue when I try to intensify activities since hernia surgery. Most recent event over the last 2 days after attempting a jog. Worried about reactivity to mesh or autoimmune issue.  Symptom onset:  1-3 days ago  Symptoms include:  Fever (100.4 this am), achey body, chills/sweats, fatigue, mild increase in pain at surgical site - all makes it impossible to be active and difficult to work  Symptom intensity:  Moderate  Symptom progression:  Staying the same  Had these symptoms before:  Yes  Has tried/received treatment for these symptoms:  No  What makes it worse:  Exercising, activity  What makes it better:  Ibuprofen    She eats 4 or more servings of fruits and vegetables daily.She consumes 0 sweetened beverage(s) daily.She exercises with enough effort to increase her heart rate 60 or more minutes per day.  She exercises with enough effort to increase her heart rate 7 days per week.   She is taking medications regularly.             Review of Systems  Constitutional, HEENT,  "cardiovascular, pulmonary, gi and gu systems are negative, except as otherwise noted.      Objective    /81   Pulse 116   Temp 99  F (37.2  C) (Oral)   Resp 18   Ht 1.753 m (5' 9\")   Wt 63.4 kg (139 lb 12.8 oz)   LMP 04/05/2024 (Approximate)   SpO2 99%   BMI 20.64 kg/m    Body mass index is 20.64 kg/m .  Physical Exam   GENERAL: alert and no distress  NECK: no adenopathy, no asymmetry, masses, or scars  RESP: lungs clear to auscultation - no rales, rhonchi or wheezes  CV: regular rate and rhythm, normal S1 S2, no S3 or S4, no murmur, click or rub, no peripheral edema  ABDOMEN: soft, nontender, no hepatosplenomegaly, no masses and bowel sounds normal  MS: no gross musculoskeletal defects noted, no edema  No CVA tenderness.  No abdominal pain no pain in the surgical site as per my exam.        Signed Electronically by: Justus Gutierrez MD    "

## 2024-04-19 NOTE — TELEPHONE ENCOUNTER
Spoke to the patient and she was told the urine culture has been added to the labs.     She does not feel good   and is worried because her fever is at 101.4 oral.     She is taking ibuprofen  200 mg , two tabs every 6 hours.     She has pelvic discomfort below belly button on left pelvic  side .     She is concerned about the blood with in the urine .     Explained to the patient  that not all lab test are resulted.     Is there any other assurance we can offer  until her labs are complete?       Alisha Wiggins RN  [facility]

## 2024-04-20 LAB — BACTERIA UR CULT: NORMAL

## 2024-04-21 LAB
ALBUMIN SERPL BCG-MCNC: 4.3 G/DL (ref 3.5–5.2)
ALP SERPL-CCNC: 64 U/L (ref 40–150)
ALT SERPL W P-5'-P-CCNC: 21 U/L (ref 0–50)
ANION GAP SERPL CALCULATED.3IONS-SCNC: 10 MMOL/L (ref 7–15)
AST SERPL W P-5'-P-CCNC: 19 U/L (ref 0–45)
BILIRUB SERPL-MCNC: 0.4 MG/DL
BUN SERPL-MCNC: 11.2 MG/DL (ref 6–20)
CALCIUM SERPL-MCNC: 9.2 MG/DL (ref 8.6–10)
CHLORIDE SERPL-SCNC: 104 MMOL/L (ref 98–107)
CK SERPL-CCNC: 34 U/L (ref 26–192)
CREAT SERPL-MCNC: 0.67 MG/DL (ref 0.51–0.95)
CRP SERPL-MCNC: 26.8 MG/L
DEPRECATED HCO3 PLAS-SCNC: 24 MMOL/L (ref 22–29)
EGFRCR SERPLBLD CKD-EPI 2021: >90 ML/MIN/1.73M2
GLUCOSE SERPL-MCNC: 101 MG/DL (ref 70–99)
POTASSIUM SERPL-SCNC: 4.2 MMOL/L (ref 3.4–5.3)
PROT SERPL-MCNC: 7.4 G/DL (ref 6.4–8.3)
RHEUMATOID FACT SERPL-ACNC: <10 IU/ML
SODIUM SERPL-SCNC: 138 MMOL/L (ref 135–145)
TSH SERPL DL<=0.005 MIU/L-ACNC: 1.97 UIU/ML (ref 0.3–4.2)
VIT D+METAB SERPL-MCNC: 33 NG/ML (ref 20–50)

## 2024-04-22 LAB
ANA SER QL IF: NEGATIVE
B BURGDOR IGG+IGM SER QL: 9.07

## 2024-04-23 NOTE — TELEPHONE ENCOUNTER
Patient given result message from Dr. Gutierrez.  Patient verbalizes understanding and agrees with plan. Patient agrees to call back if further questions. Sofi Harding RN

## 2024-04-24 LAB
B BURGDOR IGG SERPL QL IA: >45 INDEX
B BURGDOR IGG SERPL QL IA: REACTIVE
B BURGDOR IGM SERPL QL IA: 1.06 INDEX
B BURGDOR IGM SERPL QL IA: ABNORMAL

## 2024-05-04 ENCOUNTER — LAB (OUTPATIENT)
Dept: LAB | Facility: CLINIC | Age: 34
End: 2024-05-04
Payer: COMMERCIAL

## 2024-05-04 DIAGNOSIS — A69.20 LYME DISEASE: Primary | ICD-10-CM

## 2024-05-04 DIAGNOSIS — R53.83 OTHER FATIGUE: ICD-10-CM

## 2024-05-04 PROCEDURE — 86617 LYME DISEASE ANTIBODY: CPT

## 2024-05-04 PROCEDURE — 36415 COLL VENOUS BLD VENIPUNCTURE: CPT

## 2024-05-04 PROCEDURE — 86618 LYME DISEASE ANTIBODY: CPT

## 2024-05-06 LAB — B BURGDOR IGG+IGM SER QL: 9.26

## 2024-05-07 LAB
B BURGDOR IGG SERPL QL IA: >45 INDEX
B BURGDOR IGG SERPL QL IA: REACTIVE
B BURGDOR IGM SERPL QL IA: 0.99 INDEX
B BURGDOR IGM SERPL QL IA: ABNORMAL

## 2024-05-07 RX ORDER — DOXYCYCLINE 100 MG/1
100 CAPSULE ORAL 2 TIMES DAILY
Qty: 42 CAPSULE | Refills: 0 | Status: SHIPPED | OUTPATIENT
Start: 2024-05-07 | End: 2024-05-28

## 2024-05-19 ENCOUNTER — MYC MEDICAL ADVICE (OUTPATIENT)
Dept: FAMILY MEDICINE | Facility: CLINIC | Age: 34
End: 2024-05-19
Payer: COMMERCIAL

## 2024-05-20 NOTE — TELEPHONE ENCOUNTER
If you have finished atleast 10- 14 days, it should be ok.  Labs are not recommend so early as it wont change.

## 2024-05-20 NOTE — TELEPHONE ENCOUNTER
Hello, please review [employer] message and advise, if appropriate.    Thank you,  TIFFANIE Hamilton

## 2024-08-19 ENCOUNTER — TRANSFERRED RECORDS (OUTPATIENT)
Dept: HEALTH INFORMATION MANAGEMENT | Facility: CLINIC | Age: 34
End: 2024-08-19
Payer: COMMERCIAL

## 2024-09-05 ENCOUNTER — APPOINTMENT (OUTPATIENT)
Dept: ULTRASOUND IMAGING | Facility: CLINIC | Age: 34
End: 2024-09-05
Attending: EMERGENCY MEDICINE
Payer: COMMERCIAL

## 2024-09-05 ENCOUNTER — HOSPITAL ENCOUNTER (EMERGENCY)
Facility: CLINIC | Age: 34
Discharge: HOME | End: 2024-09-05
Attending: EMERGENCY MEDICINE | Admitting: EMERGENCY MEDICINE
Payer: COMMERCIAL

## 2024-09-05 ENCOUNTER — TELEPHONE (OUTPATIENT)
Dept: UROLOGY | Facility: CLINIC | Age: 34
End: 2024-09-05

## 2024-09-05 ENCOUNTER — APPOINTMENT (OUTPATIENT)
Dept: GENERAL RADIOLOGY | Facility: CLINIC | Age: 34
End: 2024-09-05
Attending: EMERGENCY MEDICINE
Payer: COMMERCIAL

## 2024-09-05 ENCOUNTER — APPOINTMENT (OUTPATIENT)
Dept: CT IMAGING | Facility: CLINIC | Age: 34
End: 2024-09-05
Attending: EMERGENCY MEDICINE
Payer: COMMERCIAL

## 2024-09-05 VITALS
HEIGHT: 69 IN | BODY MASS INDEX: 19.26 KG/M2 | DIASTOLIC BLOOD PRESSURE: 71 MMHG | HEART RATE: 62 BPM | TEMPERATURE: 98.5 F | WEIGHT: 130 LBS | OXYGEN SATURATION: 98 % | RESPIRATION RATE: 16 BRPM | SYSTOLIC BLOOD PRESSURE: 115 MMHG

## 2024-09-05 DIAGNOSIS — N20.0 KIDNEY STONE: ICD-10-CM

## 2024-09-05 DIAGNOSIS — R10.9 LEFT FLANK PAIN: ICD-10-CM

## 2024-09-05 DIAGNOSIS — N13.2 HYDRONEPHROSIS WITH URINARY OBSTRUCTION DUE TO URETERAL CALCULUS: Primary | ICD-10-CM

## 2024-09-05 LAB
ALBUMIN SERPL BCG-MCNC: 3.9 G/DL (ref 3.5–5.2)
ALBUMIN UR-MCNC: NEGATIVE MG/DL
ALP SERPL-CCNC: 58 U/L (ref 40–150)
ALT SERPL W P-5'-P-CCNC: 31 U/L (ref 0–50)
ANION GAP SERPL CALCULATED.3IONS-SCNC: 8 MMOL/L (ref 7–15)
APPEARANCE UR: CLEAR
AST SERPL W P-5'-P-CCNC: 20 U/L (ref 0–45)
BASOPHILS # BLD AUTO: 0.1 10E3/UL (ref 0–0.2)
BASOPHILS NFR BLD AUTO: 1 %
BILIRUB SERPL-MCNC: 0.4 MG/DL
BILIRUB UR QL STRIP: NEGATIVE
BUN SERPL-MCNC: 9.7 MG/DL (ref 6–20)
CALCIUM SERPL-MCNC: 8.7 MG/DL (ref 8.8–10.4)
CHLORIDE SERPL-SCNC: 109 MMOL/L (ref 98–107)
COLOR UR AUTO: ABNORMAL
CREAT SERPL-MCNC: 0.66 MG/DL (ref 0.51–0.95)
EGFRCR SERPLBLD CKD-EPI 2021: >90 ML/MIN/1.73M2
EOSINOPHIL # BLD AUTO: 0.1 10E3/UL (ref 0–0.7)
EOSINOPHIL NFR BLD AUTO: 3 %
ERYTHROCYTE [DISTWIDTH] IN BLOOD BY AUTOMATED COUNT: 13 % (ref 10–15)
GLUCOSE SERPL-MCNC: 104 MG/DL (ref 70–99)
GLUCOSE UR STRIP-MCNC: NEGATIVE MG/DL
HCG UR QL: NEGATIVE
HCO3 SERPL-SCNC: 23 MMOL/L (ref 22–29)
HCT VFR BLD AUTO: 38.6 % (ref 35–47)
HGB BLD-MCNC: 12.6 G/DL (ref 11.7–15.7)
HGB UR QL STRIP: ABNORMAL
IMM GRANULOCYTES # BLD: 0 10E3/UL
IMM GRANULOCYTES NFR BLD: 0 %
KETONES UR STRIP-MCNC: NEGATIVE MG/DL
LEUKOCYTE ESTERASE UR QL STRIP: NEGATIVE
LYMPHOCYTES # BLD AUTO: 1.1 10E3/UL (ref 0.8–5.3)
LYMPHOCYTES NFR BLD AUTO: 25 %
MCH RBC QN AUTO: 28.3 PG (ref 26.5–33)
MCHC RBC AUTO-ENTMCNC: 32.6 G/DL (ref 31.5–36.5)
MCV RBC AUTO: 87 FL (ref 78–100)
MONOCYTES # BLD AUTO: 0.4 10E3/UL (ref 0–1.3)
MONOCYTES NFR BLD AUTO: 10 %
NEUTROPHILS # BLD AUTO: 2.6 10E3/UL (ref 1.6–8.3)
NEUTROPHILS NFR BLD AUTO: 60 %
NITRATE UR QL: NEGATIVE
NRBC # BLD AUTO: 0 10E3/UL
NRBC BLD AUTO-RTO: 0 /100
PH UR STRIP: 7 [PH] (ref 5–7)
PLATELET # BLD AUTO: 186 10E3/UL (ref 150–450)
POTASSIUM SERPL-SCNC: 4.3 MMOL/L (ref 3.4–5.3)
PROT SERPL-MCNC: 6.4 G/DL (ref 6.4–8.3)
RBC # BLD AUTO: 4.45 10E6/UL (ref 3.8–5.2)
RBC URINE: 1 /HPF
SODIUM SERPL-SCNC: 140 MMOL/L (ref 135–145)
SP GR UR STRIP: 1 (ref 1–1.03)
SQUAMOUS EPITHELIAL: <1 /HPF
UROBILINOGEN UR STRIP-MCNC: NORMAL MG/DL
WBC # BLD AUTO: 4.3 10E3/UL (ref 4–11)
WBC URINE: 0 /HPF

## 2024-09-05 PROCEDURE — 99285 EMERGENCY DEPT VISIT HI MDM: CPT | Mod: 25

## 2024-09-05 PROCEDURE — 96374 THER/PROPH/DIAG INJ IV PUSH: CPT

## 2024-09-05 PROCEDURE — 74018 RADEX ABDOMEN 1 VIEW: CPT

## 2024-09-05 PROCEDURE — 81001 URINALYSIS AUTO W/SCOPE: CPT | Performed by: EMERGENCY MEDICINE

## 2024-09-05 PROCEDURE — 36415 COLL VENOUS BLD VENIPUNCTURE: CPT | Performed by: EMERGENCY MEDICINE

## 2024-09-05 PROCEDURE — 258N000003 HC RX IP 258 OP 636: Performed by: EMERGENCY MEDICINE

## 2024-09-05 PROCEDURE — 96361 HYDRATE IV INFUSION ADD-ON: CPT

## 2024-09-05 PROCEDURE — 81025 URINE PREGNANCY TEST: CPT | Performed by: EMERGENCY MEDICINE

## 2024-09-05 PROCEDURE — 85004 AUTOMATED DIFF WBC COUNT: CPT | Performed by: EMERGENCY MEDICINE

## 2024-09-05 PROCEDURE — 80053 COMPREHEN METABOLIC PANEL: CPT | Performed by: EMERGENCY MEDICINE

## 2024-09-05 PROCEDURE — 74176 CT ABD & PELVIS W/O CONTRAST: CPT

## 2024-09-05 PROCEDURE — 76770 US EXAM ABDO BACK WALL COMP: CPT

## 2024-09-05 PROCEDURE — 250N000011 HC RX IP 250 OP 636: Performed by: EMERGENCY MEDICINE

## 2024-09-05 RX ORDER — TAMSULOSIN HYDROCHLORIDE 0.4 MG/1
0.4 CAPSULE ORAL DAILY
Qty: 10 CAPSULE | Refills: 0 | Status: SHIPPED | OUTPATIENT
Start: 2024-09-05 | End: 2024-09-15

## 2024-09-05 RX ORDER — ONDANSETRON 4 MG/1
4 TABLET, ORALLY DISINTEGRATING ORAL EVERY 8 HOURS PRN
Qty: 10 TABLET | Refills: 0 | Status: SHIPPED | OUTPATIENT
Start: 2024-09-05

## 2024-09-05 RX ORDER — OXYCODONE AND ACETAMINOPHEN 5; 325 MG/1; MG/1
1 TABLET ORAL EVERY 6 HOURS PRN
Qty: 12 TABLET | Refills: 0 | Status: SHIPPED | OUTPATIENT
Start: 2024-09-05 | End: 2024-09-08

## 2024-09-05 RX ORDER — IBUPROFEN 600 MG/1
600 TABLET, FILM COATED ORAL EVERY 6 HOURS PRN
Qty: 60 TABLET | Refills: 0 | Status: SHIPPED | OUTPATIENT
Start: 2024-09-05

## 2024-09-05 RX ORDER — KETOROLAC TROMETHAMINE 15 MG/ML
15 INJECTION, SOLUTION INTRAMUSCULAR; INTRAVENOUS ONCE
Status: COMPLETED | OUTPATIENT
Start: 2024-09-05 | End: 2024-09-05

## 2024-09-05 RX ADMIN — KETOROLAC TROMETHAMINE 15 MG: 15 INJECTION, SOLUTION INTRAMUSCULAR; INTRAVENOUS at 08:30

## 2024-09-05 RX ADMIN — SODIUM CHLORIDE 1000 ML: 9 INJECTION, SOLUTION INTRAVENOUS at 08:30

## 2024-09-05 ASSESSMENT — ACTIVITIES OF DAILY LIVING (ADL)
ADLS_ACUITY_SCORE: 35
ADLS_ACUITY_SCORE: 33
ADLS_ACUITY_SCORE: 35
ADLS_ACUITY_SCORE: 35

## 2024-09-05 ASSESSMENT — COLUMBIA-SUICIDE SEVERITY RATING SCALE - C-SSRS
1. IN THE PAST MONTH, HAVE YOU WISHED YOU WERE DEAD OR WISHED YOU COULD GO TO SLEEP AND NOT WAKE UP?: NO
2. HAVE YOU ACTUALLY HAD ANY THOUGHTS OF KILLING YOURSELF IN THE PAST MONTH?: NO
6. HAVE YOU EVER DONE ANYTHING, STARTED TO DO ANYTHING, OR PREPARED TO DO ANYTHING TO END YOUR LIFE?: NO

## 2024-09-05 NOTE — ED NOTES
Lab called and reported critical K of 6.3 yet said the sample of moderately hemolyzed so we will redraw

## 2024-09-05 NOTE — TELEPHONE ENCOUNTER
LM for patient with RN direct number.     TIFFANIE Arias  Care Coordinator- Urology   [phone]

## 2024-09-05 NOTE — TELEPHONE ENCOUNTER
Spoke with patient and scheduled. Rec she continue to strain urine and take tamsulosin to add in stone passage until time of appt.     TIFFANIE Arias  Care Coordinator- Urology   [phone]

## 2024-09-05 NOTE — ED PROVIDER NOTES
"  Emergency Department Note      History of Present Illness     Chief Complaint   Flank Pain    HPI   Arabella Farris is a 33 year old female who presents for left-sided flank pain. Patient reports that she developed central abdominal pain last night that she attributed to her prior inguinal hernia repair, but around 4:00am the pain wrapped around her left-side and woke her up. She took Tylenol which turned the pain from sharp to a dull ache. She currently rates her pain 3/10. Patient had some nausea but took Zofran which helped. She says that she has known kidney stones. Arabella is having some vaginal bleeding but says that she is about to get her menstrual period so this is not abnormal for her. No dysuria or other urinary symtoms. She notes that she had a hip MRI done yesterday and she has had five CT scans this past year and would like to avoid one today.    Independent Historian   None    Review of External Notes   None    Past Medical History     Medical History and Problem List   Anemia   Lyme disease  Lyme arthritis of knee  Asthma   SVT   Left inguinal hernia   Patellofemoral syndrome   Allergic rhinitis     Medications   Albuterol inhaler     Surgical History   Cholecystectomy  Left hip labral repair     Physical Exam     Patient Vitals for the past 24 hrs:   BP Temp Temp src Pulse Resp SpO2 Height Weight   09/05/24 0830 -- -- -- -- -- -- 1.753 m (5' 9\") 59 kg (130 lb)   09/05/24 0506 115/71 98.5  F (36.9  C) Temporal 62 16 98 % -- 59 kg (130 lb)     Physical Exam  General: Alert, appears well-developed and well-nourished. Cooperative.     In mild distress  HEENT:  Head:  Atraumatic  Ears:  External ears are normal  Mouth/Throat:  Oropharynx is without erythema or exudate and mucous membranes are moist.   Eyes:   Conjunctivae normal and EOM are normal. No scleral icterus.  CV:  Normal rate, regular rhythm, normal heart sounds and radial pulses are 2+ and symmetric.  No murmur.  Resp:  Breath sounds are " clear bilaterally    Non-labored, no retractions or accessory muscle use  GI:  Abdomen is soft, no distension, no tenderness. No rebound or guarding.  Left CVA tenderness  MS:  Normal range of motion. No edema.    Normal strength in all 4 extremities.     Back atraumatic.    No midline cervical, thoracic, or lumbar tenderness  Skin:  Warm and dry.  No rash or lesions noted.  Neuro:   Alert. Normal strength.  GCS: 15  Psych: Normal mood and affect.    Diagnostics     Lab Results   Labs Ordered and Resulted from Time of ED Arrival to Time of ED Departure   ROUTINE UA WITH MICROSCOPIC REFLEX TO CULTURE - Abnormal       Result Value    Color Urine Straw      Appearance Urine Clear      Glucose Urine Negative      Bilirubin Urine Negative      Ketones Urine Negative      Specific Gravity Urine 1.002 (*)     Blood Urine Moderate (*)     pH Urine 7.0      Protein Albumin Urine Negative      Urobilinogen Urine Normal      Nitrite Urine Negative      Leukocyte Esterase Urine Negative      RBC Urine 1      WBC Urine 0      Squamous Epithelials Urine <1     COMPREHENSIVE METABOLIC PANEL - Abnormal    Sodium 140      Potassium 4.3      Carbon Dioxide (CO2) 23      Anion Gap 8      Urea Nitrogen 9.7      Creatinine 0.66      GFR Estimate >90      Calcium 8.7 (*)     Chloride 109 (*)     Glucose 104 (*)     Alkaline Phosphatase 58      AST 20      ALT 31      Protein Total 6.4      Albumin 3.9      Bilirubin Total 0.4     HCG QUALITATIVE URINE - Normal    hCG Urine Qualitative Negative     CBC WITH PLATELETS AND DIFFERENTIAL    WBC Count 4.3      RBC Count 4.45      Hemoglobin 12.6      Hematocrit 38.6      MCV 87      MCH 28.3      MCHC 32.6      RDW 13.0      Platelet Count 186      % Neutrophils 60      % Lymphocytes 25      % Monocytes 10      % Eosinophils 3      % Basophils 1      % Immature Granulocytes 0      NRBCs per 100 WBC 0      Absolute Neutrophils 2.6      Absolute Lymphocytes 1.1      Absolute Monocytes 0.4       Absolute Eosinophils 0.1      Absolute Basophils 0.1      Absolute Immature Granulocytes 0.0      Absolute NRBCs 0.0     ISTAT CREATININE POCT     Imaging   Abd/pelvis CT no contrast - Stone Protocol   Preliminary Result   IMPRESSION:    1.  Obstructing 0.5 cm stone at the left ureterovesical junction   causing mild left hydronephrosis.   2.  Small nonobstructing stones in both kidneys.   3.  Large amount of stool in the rectum, suggesting constipation.   4.  A 3.5 cm hypodense right hepatic lesion is indeterminate and not   definitively characterized, but could represent a hemangioma.   Nonemergent liver MRI may be helpful for more definitive   characterization.      US Renal Complete Non-Vascular   Final Result   IMPRESSION:   Unremarkable renal ultrasound. No hydronephrosis.      ARMINDA WASSERMAN MD            SYSTEM ID:  [id number]      Abdomen XR 1 vw   Final Result   IMPRESSION: 4 mm calcification overlying the left lower pelvis may   reflect a bladder/ureteral stone vs phlebolith. Consider further   characterization with CT exam given history of left flank pain.      Moderate to large stool burden is seen throughout the colon and   rectum. No discrete calcifications are seen overlying the kidneys   although evaluation is limited due to overlying stool and bowel gas.   No acute bony abnormality. Surgical clips overly the right upper   abdomen.      DESTIN BENAVIDES MD            SYSTEM ID:  [id number]        EKG   None     Independent Interpretation   None    ED Course      Medications Administered   Medications   sodium chloride 0.9% BOLUS 1,000 mL (1,000 mLs Intravenous $New Bag 9/5/24 0830)   ketorolac (TORADOL) injection 15 mg (15 mg Intravenous $Given 9/5/24 0830)     Procedures   None      Discussion of Management   None    ED Course   ED Course as of 09/05/24 1244   u Sep 05, 2024   0805 I evaluated the patient, obtained history, and performed a physical exam as detailed above.    1023 I rechecked on the  patient and updated on plan of care.    1232 I rechecked on the patient and explained the plan for discharge. They are comfortable with this plan.      Additional Documentation  None    Medical Decision Making / Diagnosis     CMS Diagnoses: None    MIPS   None    MDM   Arabella Farris is a 33-year-old female who presents with left-sided flank pain.  Symptoms seem most concerning for likely renal colic and a discussion with the patient we had recommended a CT scan of the abdomen and pelvis to evaluate for potential kidney stone.  Patient has had multiple CT scans in recent years and would prefer not to obtain a CT scan right away as she is concerned for potential radiation exposure.  We did initially obtain laboratory work, pregnancy test and urinalysis.  Thankfully no signs of hematuria or infection.  CBC and CMP is unremarkable.  Patient did initially have an ultrasound to evaluate for potential hydronephrosis although this appeared normal.  We also obtained a KUB knowing that the sensitivity and specificity for kidney stone was very low given the patient did not want to do a CT scan initially.  This KUB x-ray did show a 4 mm calcification over the left lower pelvis that could represent a kidney stone but given lack of hematuria and the possibility of other sinister intra-abdominal abnormalities we did still recommend a CT scan to further delineate the etiology of her ongoing pain.  Patient ultimately agreed to a CT scan without contrast and we did identify a 5 mm stone at the left UVJ causing mild left-sided hydronephrosis.  There is also a large amount of stool within the rectum concerning for constipation.  Patient was educated both on the suspected progression of kidney stone given its presence at the left UVJ.  We discussed pain medication and need for close outpatient follow-up with urology should her symptoms continue.  Return precautions for development of fever, vomiting or inability to tolerate oral  intake.  We also discussed typical management of constipation.  After all questions answered and return precautions understood, discharged home.    Disposition   The patient was discharged.     Diagnosis     ICD-10-CM    1. Hydronephrosis with urinary obstruction due to ureteral calculus  N13.2       2. Left flank pain  R10.9 Adult Urology Referral      3. Kidney stone  N20.0 Adult Urology Referral           Discharge Medications   New Prescriptions    IBUPROFEN (ADVIL/MOTRIN) 600 MG TABLET    Take 1 tablet (600 mg) by mouth every 6 hours as needed for moderate pain.    ONDANSETRON (ZOFRAN ODT) 4 MG ODT TAB    Take 1 tablet (4 mg) by mouth every 8 hours as needed for nausea or vomiting.    OXYCODONE-ACETAMINOPHEN (PERCOCET) 5-325 MG TABLET    Take 1 tablet by mouth every 6 hours as needed for breakthrough pain or severe pain.    TAMSULOSIN (FLOMAX) 0.4 MG CAPSULE    Take 1 capsule (0.4 mg) by mouth daily for 10 doses.     Scribe Disclosure:  Juanita HUIZAR, am serving as a scribe at 8:05 AM on 9/5/2024 to document services personally performed by Linwood Laird MD based on my observations and the provider's statements to me.        Linwood Laird MD  09/05/24 7093

## 2024-09-05 NOTE — ED TRIAGE NOTES
Pt reports left flank pain radiating to abdomen x1 hour with nausea. Pt took one tylenol and zofran with minimal relief. Denies urinary symptoms     Triage Assessment (Adult)       Row Name 09/05/24 0508          Respiratory WDL    Respiratory WDL WDL        Cardiac WDL    Cardiac WDL WDL        Cognitive/Neuro/Behavioral WDL    Cognitive/Neuro/Behavioral WDL WDL

## 2024-09-05 NOTE — TELEPHONE ENCOUNTER
Health Call Center    Phone Message    May a detailed message be left on voicemail: yes     Reason for Call: Other: Patient has [place]: 1-2 Days referral from Linwood Laird MD from ED visit 9/5/24 for Left flank pain [R10.9], Kidney stone [N20.0]. Patient notes that she is leaving the country on 9[place]14[place]24 and is hoping that she can be seen prior to then. Referral not yet triaged. Sending TE to update on patient availability.     Action Taken: Message routed to:  [facility] ([facility] Urology    Travel Screening: Not Applicable

## 2024-09-06 ENCOUNTER — PATIENT OUTREACH (OUTPATIENT)
Dept: FAMILY MEDICINE | Facility: CLINIC | Age: 34
End: 2024-09-06
Payer: COMMERCIAL

## 2024-09-06 NOTE — TELEPHONE ENCOUNTER
"  Transitions of Care Outreach  Chief Complaint   Patient presents with    Hospital F/U     ED visit 9/5/24 Hydronephrosis with urinary obstruction due to ureteral calculus       Most Recent Admission Date: 9/5/2024   Most Recent Admission Diagnosis:      Most Recent Discharge Date: 9/5/2024   Most Recent Discharge Diagnosis: Left flank pain - R10.9  Kidney stone - N20.0  Hydronephrosis with urinary obstruction due to ureteral calculus - N13.2     Transitions of Care Assessment    Discharge Assessment  How are you doing now that you are home?: \"I have a little kidney stone and litterally just peed it out before you called.\"  How are your symptoms? (Red Flag symptoms escalate to triage hotline per guidelines): Improved  Do you know how to contact your clinic care team if you have future questions or changes to your health status? : Yes  Does the patient have their discharge instructions? : Yes  Does the patient have questions regarding their discharge instructions? : No  Were you started on any new medications or were there changes to any of your previous medications? : Yes (I didn't take any of them)  Does the patient have all of their medications?: Yes  Do you have questions regarding any of your medications? : No  Do you have all of your needed medical supplies or equipment (DME)?  (i.e. oxygen tank, CPAP, cane, etc.): Yes    Follow up Plan     Discharge Follow-Up  Discharge follow up appointment scheduled in alignment with recommended follow up timeframe or Transitions of Risk Category? (Low = within 30 days; Moderate= within 14 days; High= within 7 days): No (I am seeing a urologist for this)  Patient's follow up appointment not scheduled: Patient declined scheduling support. Education on the importance of transitions of care follow up. Provided scheduling phone number.    Future Appointments   Date Time Provider Department Center   9/9/2024 10:00 AM Fatoumata Cooley APRN CNP [facility]   11/4/2024 10:30 " Phillip Toney MD [facility]       Outpatient Plan as outlined on AVS reviewed with patient.    For any urgent concerns, please contact our 24 hour nurse triage line: [phone] ([phone])       Ellie Lima RN

## 2024-09-09 ENCOUNTER — TRANSFERRED RECORDS (OUTPATIENT)
Dept: HEALTH INFORMATION MANAGEMENT | Facility: CLINIC | Age: 34
End: 2024-09-09

## 2024-09-09 ENCOUNTER — VIRTUAL VISIT (OUTPATIENT)
Dept: UROLOGY | Facility: CLINIC | Age: 34
End: 2024-09-09
Payer: COMMERCIAL

## 2024-09-09 ENCOUNTER — LAB (OUTPATIENT)
Dept: LAB | Facility: CLINIC | Age: 34
End: 2024-09-09
Payer: COMMERCIAL

## 2024-09-09 DIAGNOSIS — N20.1 LEFT URETERAL CALCULUS: ICD-10-CM

## 2024-09-09 DIAGNOSIS — N20.0 NEPHROLITHIASIS: Primary | ICD-10-CM

## 2024-09-09 DIAGNOSIS — N20.0 NEPHROLITHIASIS: ICD-10-CM

## 2024-09-09 PROCEDURE — 82365 CALCULUS SPECTROSCOPY: CPT | Mod: 90

## 2024-09-09 PROCEDURE — 99203 OFFICE O/P NEW LOW 30 MIN: CPT | Mod: 95 | Performed by: NURSE PRACTITIONER

## 2024-09-09 PROCEDURE — 99000 SPECIMEN HANDLING OFFICE-LAB: CPT

## 2024-09-09 RX ORDER — TAMSULOSIN HYDROCHLORIDE 0.4 MG/1
0.4 CAPSULE ORAL DAILY
Qty: 15 CAPSULE | Refills: 0 | Status: SHIPPED | OUTPATIENT
Start: 2024-09-09

## 2024-09-09 ASSESSMENT — PAIN SCALES - GENERAL: PAINLEVEL: NO PAIN (0)

## 2024-09-09 NOTE — PATIENT INSTRUCTIONS
UROLOGY CLINIC VISIT PATIENT INSTRUCTIONS    -Please contact [facility] Billing at [phone] or visit [email or web address] for more information.    24 hour urine study: Litholink through [employer] or 24 hour urine saturation kit through [facility].     If you have any issues, questions or concerns in the meantime, do not hesitate to contact us at [facility] at [phone] or via [employer].     Fatoumata Cooley CNP  Department of Urology         DIET & LIFESTYLE CHANGES FOR PATIENTS WITH KIDNEY STONES    If you've had a kidney stone, you are likely to form another. Risk of recurrence is 15% at 1 year, 35% to 40% at 2 years, and 50% at 10 years. Therefore, prevention is very important. These recommendations have shown to be effective.    CALCIUM STONES (Oxalate and Phosphate)    Fluid intake is the most important prevention measure to help prevent stones. Fluid intake should be at least 2.5 liters per day or 90-120oz per day. With goal of urine output of >2.5L per day.   Increasing liquids that have citric acid may help such as low calorie orange juice, lemonade (Crystal Light Lemonade or True Lemon/Lime), or adding a citrus to your water.  You can add lemon juice or fresh teri to your water daily.  Lemon juice increases the citrate in your urine, and helps decrease the formation of stone and even breakdown certain types of stones. Add a cap full/teaspoon of pure lemon juice to each glass.   Try to limit sugar, especially if you have diabetes.    Helpful Fluid Measurements:  1 liter = 34oz  1 quart = 32 oz  24 pack water: Each bottle 16.9 oz     Low Oxalate Diet: Limit your consumption of OXALATE-rich foods including:  All nuts and nut products including peanuts, almonds,peanut butter, almond milk  Spinach  Rhubarb  Beets  Chocolate  Soybeans and soy products   Website:   [email or web address]    Below is a link to a  "PDF that is based on [employer] research. Please stick to pages 6-9 of this document. My suggestion is to review the list of food that is OK. The \"avoid\" list can be overwhelming.  [email or web address]    Low Sodium Diet: Salt (sodium chloride) is found in abundance in many foods. High sodium levels in the urine can interfere with the kidney's handling of calcium.   Trying a DASH (Dietary Approaches to Stop Hypertension) diet which is eating more fruits and vegetables, limiting salt intake, moderate in low-fat diary products, and low in animal protein.   Try to decrease salt intake to <2000 mg of sodium daily.     Tips for reducing the salt in your diet:  Don't use salt at the table  Reduce the salt used in food preparation. Try 1/2 teaspoon when recipes call for 1 teaspoon.  Use herbs and spices for flavoring instead of salt.  Avoid salty foods.  Check the label before you buy or use a product. Note sodium and portion size information.  Try to consume less than 2,000 mg/day. (1 teaspoon = 2,000 mg)    Foods with high sodium content include:  Processed meat (including luncheon meats, sausage)   Crackers   Instant cereal   Processed cheese   Canned soups   Chips and snack foods   Soy sauce    Low Animal Protein: Reduce animal protein (meat) intake to no more than 8-10 ounces per day. Increase fruit and vegetables to 5 servings per day.    Maintain a normal calcium diet: Calcium rich foods are encouraged, but no more than 1000 - 1200 mg per day. Researches have found that people with low calcium intakes tend to have more stones. Foods with high calcium content are acceptable and include:  Calcium rich foods include:   Diary (cheese, milk, and yogurt)  Enriched cereals  Meat and fish  Dark green vegetables  Non-Dairy Calcium Sources  Fortified Coconut, Rice, Flax, Oat milk  (avoid " almond milk)  Calcium fortified Orange Juice  - look for a low sugar/light variety  Canned sardines, canned pink salmon with bones  - look for low sodium options  Fortified cereals  Oatmeal  Broccoli, peas, chinese cabbage/bok jesus, Kale, mustard greens, pistachio nuts, mung beans, red kidney beans     Limit Vitamin C intake to < 1000 mg daily.    Consultation with a dietician may be helpful as well.  Please let our staff know if you are interested in this helpful option so a consult may be placed for you.     Medicines to Control Your Kidney Stone Symptoms    Control Pain: First Line Treatment    Dramamine (Please use the drowsy version, nongeneric formulation)  Available over the counter  **This medicine will cause increased drowsiness. DON T DRIVE OR OPERATE MACHINERY FOR 6 HOURS**    How to take:   Take 50 mg at bedtime every night until the stone passes  In addition, take 50 mg every 6 hours as needed    What it does:  Decreases spasm of the ureter  Decreases recurrence of pain for next 24 hours  Decreases severe pain  Decreases nausea  Will help you sleep    Ibuprofen (Advil or Motrin)  Available over the counter  **Please do not take if advise to avoid NSAIDS, history of stomach ulcers/bleeding issues, blood thinners, or already on NSAIDS**    How to take:   Take 2 to 4 (200 mg) tablets every 6 hours for the first 48 hours. After that, use only as needed    What it does:  Decreases pain  Prevents spasm of the ureter    Acetaminophen (Tylenol)   Available over the counter    How to Take:  Take 2 (500 mg) tablets every 6 hours as needed. Do not exceed 8 tablets (4,000 mg total) in 24 hours    What it does:  Highly effective in controlling pain    Other medicines we may give you:    Tamsulosin (Flomax): Take 0.4 mg daily with food     What it does:   May decrease stone pain   May help stones pass faster   May make surgery more successful by improving access to stone   May decrease discomfort from ureteral stent,  if used    Possible side effects:   Lightheadedness when standing too quickly (especially in older people)   Stuffy nose

## 2024-09-09 NOTE — PROGRESS NOTES
Urology Video Office Visit    Video-Visit Details    Type of service:  Video Visit    Video Start Time: 1000    Video End Time: 1040    Originating Location (pt. Location): Home    Distant Location (provider location):  Off-site     Platform used for Video Visit: BitAnimate           Assessment and Plan:     Assessment:33 year old female with nephrolithiasis    Plan:  -Reviewed CT scan with patient. Noted 3mm nonobstructing left renal stone and a punctate nonobstructing right renal stone.   -Discussed option to bring in recently pass left ureteral stone for stone analysis. Pt amenable to plan of care.   -The patient and I discussed the diagnosis and natural history of urolithiasis. Discussed option for 24 hour urine study for further evaluation for risk of stones. Pt would like to contact billing for cost estimate. She will notify Urology if she would like to pursue   -We discussed some general measures to prevent recurrent kidney stones.  These include fluid intake to achieve 2.5 liters of urine per day, decreased salt intake, normal calcium intake, lowering animal protein intake, avoiding high amounts of oxalate containing foods, and increased citrate intake with orange juice/lemonade.  -RTC in 1 year with renal US or sooner LALIT Cooley CNP  Department of Urology  September 9, 2024    I spent a total of 40 minutes spent on the date of the encounter doing chart review, history and exam, documentation, and further activities as noted above.          Chief Complaint:   Left UVJ Stone         History of Present Illness:    Arabella Farris is a pleasant 33 year old female who presents with concerns of a left UVJ    MsKhalida Farris was seen in the ER on 9/5/24 for left flank pain.     CT scan on 09/05/24 (images personally reviewed) revealed  a left 5mm UVJ stone with hydronephrosis. Noted 3mm nonobstructing left renal stone. Noted punctate nonobstructing right renal stone. No noted right hydronephrosis.      She was able to pass and retrieve her stone over the weekend. Notes some slight ongoing symptoms which have been improving. Denies any f/c/n/v, gross hematuria, or dysuria.     This was her first stone episode.   Family history of nephrolithiasis in father and maternal aunt.     Stone Risk Factors: None    She was previously taking Vit B with C.          Past Medical History:     Past Medical History:   Diagnosis Date    Anemia     Overview:   1/12 hgb 11    Lyme arthritis of knee (H)     Overview:   -Follows with Dr. Matt Wilson and Dr. Gopal Nazario  -Left knee lyme arthritis diagnosed with positive PCR on synovial fluid along with positive serologies late October 2015. Completed 24 days of PO Doxy followed by IV ceftriaxone for 28 days  -Issues with tachycardia and fever thought secondary to Herxheimer Reaction. Resolved on Prednisone taper.    Lyme disease 2013    Uncomplicated asthma 1996            Past Surgical History:     Past Surgical History:   Procedure Laterality Date    CHOLECYSTECTOMY  2016    CHOLECYSTOSTOMY  2016    HC TOOTH EXTRACTION W/FORCEP      ORTHOPEDIC SURGERY  2018    left hip labral repair            Medications     Current Outpatient Medications   Medication Sig Dispense Refill    albuterol (PROAIR HFA/PROVENTIL HFA/VENTOLIN HFA) 108 (90 Base) MCG/ACT inhaler Inhale 2 puffs into the lungs every 4 hours as needed for shortness of breath, wheezing or cough 18 g 3    ibuprofen (ADVIL/MOTRIN) 600 MG tablet Take 1 tablet (600 mg) by mouth every 6 hours as needed for moderate pain. 60 tablet 0    multivitamin, therapeutic with minerals (MULTI-VITAMIN) TABS tablet Take 1 tablet by mouth daily      Omega-3 Fatty Acids (FISH OIL CONCENTRATE PO)       ondansetron (ZOFRAN ODT) 4 MG ODT tab Take 1 tablet (4 mg) by mouth every 8 hours as needed for nausea or vomiting. 10 tablet 0    Probiotic Product (PROBIOTIC ADVANCED PO)       tamsulosin (FLOMAX) 0.4 MG capsule Take 1 capsule (0.4 mg) by  mouth daily for 10 doses. 10 capsule 0    tretinoin (RETIN-A) 0.05 % external cream       vitamin B complex with vitamin C (VITAMIN  B COMPLEX) TABS tablet Take 1 tablet by mouth daily       No current facility-administered medications for this visit.            Family History:     Family History   Problem Relation Age of Onset    Hypertension Mother     Vasculitis Father     Breast Cancer Paternal Grandmother 60         at 67    Breast Cancer Maternal Aunt 45        maternal great aunt    Breast Cancer Paternal Aunt 49         at 49    Breast Cancer Paternal Aunt 65         at 69    Breast Cancer Cousin 40        maternal first cousin    Cervical Cancer Cousin 41    Pancreatic Cancer Other 79        maternal great granmother    Breast Cancer Other         paternal great aunt    Prostate Cancer Other 75        paternal great grandfather;  at 76    Autoimmune Disease No family hx of             Social History:     Social History     Socioeconomic History    Marital status:      Spouse name: Not on file    Number of children: 0    Years of education: Not on file    Highest education level: Not on file   Occupational History     Employer: STUDENT     Comment: [employer]   Tobacco Use    Smoking status: Never    Smokeless tobacco: Never   Vaping Use    Vaping status: Never Used   Substance and Sexual Activity    Alcohol use: Yes     Alcohol/week: 1.0 standard drink of alcohol     Types: 1 Standard drinks or equivalent per week    Drug use: No    Sexual activity: Yes     Partners: Male     Birth control/protection: Condom   Other Topics Concern    Parent/sibling w/ CABG, MI or angioplasty before 65F 55M? 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     Social Determinants of Health     Financial Resource Strain: Low Risk  (12/10/2023)    Financial Resource Strain     Within the past 12 months, have you or your family members you live with been  unable to get utilities (heat, electricity) when it was really needed?: No   Food Insecurity: Low Risk  (12/10/2023)    Food Insecurity     Within the past 12 months, did you worry that your food would run out before you got money to buy more?: No     Within the past 12 months, did the food you bought just not last and you didn t have money to get more?: No   Transportation Needs: Low Risk  (12/10/2023)    Transportation Needs     Within the past 12 months, has lack of transportation kept you from medical appointments, getting your medicines, non-medical meetings or appointments, work, or from getting things that you need?: No   Physical Activity: Sufficiently Active (8/10/2023)    [facility]    Exercise Vital Sign     Days of Exercise per Week: 6 days     Minutes of Exercise per Session: 60 min   Stress: Not on file   Social Connections: Unknown (6/18/2024)    Received from [employer]    Social [employer]     How often do you feel lonely or isolated from those around you? (Adult - for ages 18 years and over): Not on file   Interpersonal Safety: Low Risk  (4/19/2024)    Interpersonal Safety     Do you feel physically and emotionally safe where you currently live?: Yes     Within the past 12 months, have you been hit, slapped, kicked or otherwise physically hurt by someone?: No     Within the past 12 months, have you been humiliated or emotionally abused in other ways by your partner or ex-partner?: No   Housing Stability: Low Risk  (12/10/2023)    Housing Stability     Do you have housing? : Yes     Are you worried about losing your housing?: No            Allergies:   Covid-19 ad26 vaccine(lida) and Succinylcholine         Review of Systems:  From intake questionnaire   Negative 14 system review except as noted on HPI, nurse's note.         Physical Exam:   General Appearance: Well groomed, hygenic  Eyes: No redness, discharge  Respiratory: No cough, no respiratory distress or labored  breathing  Musculoskeletal: Grossly normal, full range of motion in upper extremities, no gross deficits  Skin: No discoloration or apparent rashes  Neurologic - No tremors  Psychiatric - Alert and oriented  The rest of a comprehensive physical examination is deferred due to video visit restrictions        Labs:    I personally reviewed all applicable laboratory data and went over findings with patient  Significant for:    CBC RESULTS:  Recent Labs   Lab Test 09/05/24  0752 04/19/24  1146 11/02/23  0756   WBC 4.3 5.3 4.1   HGB 12.6 13.8 13.3    198 223        BMP RESULTS:  Recent Labs   Lab Test 09/05/24  1142 04/19/24  1146 11/02/23  0756    138 140   POTASSIUM 4.3 4.2 4.4   CHLORIDE 109* 104 105   CO2 23 24 25   ANIONGAP 8 10 10   * 101* 98   BUN 9.7 11.2 14.8   CR 0.66 0.67 0.73   GFRESTIMATED >90 >90 >90   COBY 8.7* 9.2 9.3       UA RESULTS:   Recent Labs   Lab Test 09/05/24  0748 04/19/24  1202   SG 1.002* 1.015   URINEPH 7.0 6.0   NITRITE Negative Negative   RBCU 1 2-5*   WBCU 0 0-5       CALCIUM RESULTS  Lab Results   Component Value Date    COBY 8.7 09/05/2024    COBY 9.2 04/19/2024    COBY 9.3 11/02/2023           Imaging:    I personally reviewed all applicable imaging and went over the below findings with patient.    Results for orders placed or performed during the hospital encounter of 09/05/24   US Renal Complete Non-Vascular    Narrative    US RENAL COMPLETE NON-VASCULAR 9/5/2024 8:57 AM    CLINICAL HISTORY: Left flank pain, concern for hydronephrosis, kidney  stone.  patient declining CT at this time.  TECHNIQUE: Routine Bilateral Renal and Bladder Ultrasound.    COMPARISON: Abdominal radiograph 9/5/2024.    FINDINGS:    RIGHT KIDNEY: 11.4 x 5.5 x 3.7 cm. Normal echogenicity without  hydronephrosis or masses. No cysts.    LEFT KIDNEY: 11.5 x 4.9 x 4.8 cm. Normal echogenicity without  hydronephrosis or masses. No cysts.    BLADDER: Normal.      Impression    IMPRESSION:  Unremarkable  renal ultrasound. No hydronephrosis.    ARMINDA WASSERMAN MD         SYSTEM ID:  [id number]   Abd/pelvis CT no contrast - Stone Protocol    Narrative    CT ABDOMEN AND PELVIS WITHOUT CONTRAST 9/5/2024 11:06 AM    CLINICAL HISTORY: Left flank pain.  TECHNIQUE: CT scan of the abdomen and pelvis was performed without IV  contrast. Multiplanar reformats were obtained. Dose reduction  techniques were used.  CONTRAST: None.  COMPARISON: None.    FINDINGS:   LOWER CHEST: The visualized lung bases are clear.    HEPATOBILIARY: Hypodense lesion in hepatic segment 6 laterally (series  4 image 51) measures 3.5 x 2.5 cm, indeterminate and not definitively  characterized, but possibly a hemangioma. Cholecystectomy.    PANCREAS: Normal.    SPLEEN: Normal.    ADRENAL GLANDS: Normal.    KIDNEYS/BLADDER: Obstructing 0.5 cm stone at the left ureterovesical  junction causes mild left hydronephrosis. Nonobstructing 0.3 cm stone  in the interpolar left kidney. Nonobstructing punctate 0.1 cm stone in  the lower pole of the right kidney. No ureteral calculi or  hydronephrosis on the right.    BOWEL: Large amount of stool in the rectum. No bowel obstruction. No  convincing evidence for colitis or diverticulitis. Unremarkable  appendix.    LYMPH NODES: No enlarged lymph nodes are identified in the abdomen or  pelvis.    VASCULATURE: Unremarkable.    PELVIC ORGANS: Unremarkable.    ADDITIONAL FINDINGS: None.    MUSCULOSKELETAL: Unremarkable.      Impression    IMPRESSION:   1.  Obstructing 0.5 cm stone at the left ureterovesical junction  causes mild left hydronephrosis.  2.  Small nonobstructing stones in both kidneys.  3.  Large amount of stool in the rectum, suggesting constipation.  4.  A 3.5 cm hypodense right hepatic lesion is indeterminate and not  definitively characterized, but could represent a hemangioma.  Nonemergent liver MRI may be helpful for more definitive  characterization.    KEI QUILES MD         SYSTEM ID:  [id number]

## 2024-09-09 NOTE — NURSING NOTE
[facility] [address]    Is the patient currently in the state of [place]? YES    Visit mode:VIDEO    If the visit is dropped, the patient can be reconnected by: VIDEO VISIT: Text to cell phone:   Telephone Information:   Mobile [phone]    and VIDEO VISIT: Send to e-mail at: [email or web address]    Will anyone else be joining the visit? NO  (If patient encounters technical issues they should call [phone])    How would you like to obtain your AVS? MyChart    Are changes needed to the allergy or medication list? No, Pt stated no changes to allergies, and Pt stated no med changes    Are refills needed on medications prescribed by this physician? NO    Rooming Documentation:  Not applicable      Reason for visit: Consult (NEW KIDNEY STONE )    Xiao SIMMONS

## 2024-09-12 LAB
APPEARANCE STONE: NORMAL
COMPN STONE: NORMAL
SPECIMEN WT: 41 MG

## 2024-10-03 ENCOUNTER — PATIENT OUTREACH (OUTPATIENT)
Dept: CARE COORDINATION | Facility: CLINIC | Age: 34
End: 2024-10-03
Payer: COMMERCIAL

## 2024-11-03 SDOH — HEALTH STABILITY: PHYSICAL HEALTH: ON AVERAGE, HOW MANY DAYS PER WEEK DO YOU ENGAGE IN MODERATE TO STRENUOUS EXERCISE (LIKE A BRISK WALK)?: 7 DAYS

## 2024-11-03 SDOH — HEALTH STABILITY: PHYSICAL HEALTH: ON AVERAGE, HOW MANY MINUTES DO YOU ENGAGE IN EXERCISE AT THIS LEVEL?: 120 MIN

## 2024-11-03 ASSESSMENT — ASTHMA QUESTIONNAIRES
QUESTION_2 LAST FOUR WEEKS HOW OFTEN HAVE YOU HAD SHORTNESS OF BREATH: NOT AT ALL
ACT_TOTALSCORE: 23
QUESTION_1 LAST FOUR WEEKS HOW MUCH OF THE TIME DID YOUR ASTHMA KEEP YOU FROM GETTING AS MUCH DONE AT WORK, SCHOOL OR AT HOME: NONE OF THE TIME
ACT_TOTALSCORE: 23
QUESTION_4 LAST FOUR WEEKS HOW OFTEN HAVE YOU USED YOUR RESCUE INHALER OR NEBULIZER MEDICATION (SUCH AS ALBUTEROL): ONCE A WEEK OR LESS
QUESTION_5 LAST FOUR WEEKS HOW WOULD YOU RATE YOUR ASTHMA CONTROL: COMPLETELY CONTROLLED
QUESTION_3 LAST FOUR WEEKS HOW OFTEN DID YOUR ASTHMA SYMPTOMS (WHEEZING, COUGHING, SHORTNESS OF BREATH, CHEST TIGHTNESS OR PAIN) WAKE YOU UP AT NIGHT OR EARLIER THAN USUAL IN THE MORNING: ONCE OR TWICE

## 2024-11-03 ASSESSMENT — SOCIAL DETERMINANTS OF HEALTH (SDOH): HOW OFTEN DO YOU GET TOGETHER WITH FRIENDS OR RELATIVES?: ONCE A WEEK

## 2024-11-04 ENCOUNTER — OFFICE VISIT (OUTPATIENT)
Dept: FAMILY MEDICINE | Facility: CLINIC | Age: 34
End: 2024-11-04
Payer: COMMERCIAL

## 2024-11-04 VITALS
SYSTOLIC BLOOD PRESSURE: 110 MMHG | WEIGHT: 132.3 LBS | TEMPERATURE: 97.7 F | DIASTOLIC BLOOD PRESSURE: 73 MMHG | BODY MASS INDEX: 18.94 KG/M2 | OXYGEN SATURATION: 100 % | HEART RATE: 65 BPM | HEIGHT: 70 IN | RESPIRATION RATE: 16 BRPM

## 2024-11-04 DIAGNOSIS — Z13.6 CARDIOVASCULAR SCREENING; LDL GOAL LESS THAN 160: ICD-10-CM

## 2024-11-04 DIAGNOSIS — Z00.00 ROUTINE GENERAL MEDICAL EXAMINATION AT A HEALTH CARE FACILITY: Primary | ICD-10-CM

## 2024-11-04 DIAGNOSIS — J45.20 MILD INTERMITTENT ASTHMA WITHOUT COMPLICATION: ICD-10-CM

## 2024-11-04 DIAGNOSIS — S73.191D TEAR OF RIGHT ACETABULAR LABRUM, SUBSEQUENT ENCOUNTER: ICD-10-CM

## 2024-11-04 PROBLEM — Z87.19 HX OF HERNIA REPAIR: Status: ACTIVE | Noted: 2023-11-01

## 2024-11-04 PROBLEM — Z98.890 HX OF HERNIA REPAIR: Status: ACTIVE | Noted: 2023-11-01

## 2024-11-04 PROCEDURE — 99395 PREV VISIT EST AGE 18-39: CPT | Performed by: INTERNAL MEDICINE

## 2024-11-04 RX ORDER — DESONIDE 0.5 MG/G
OINTMENT TOPICAL
COMMUNITY
Start: 2024-08-30

## 2024-11-04 RX ORDER — ALBUTEROL SULFATE 90 UG/1
2 INHALANT RESPIRATORY (INHALATION) EVERY 4 HOURS PRN
Qty: 18 G | Refills: 3 | Status: SHIPPED | OUTPATIENT
Start: 2024-11-04

## 2024-11-04 ASSESSMENT — PAIN SCALES - GENERAL: PAINLEVEL_OUTOF10: NO PAIN (0)

## 2024-11-04 NOTE — PROGRESS NOTES
Preventive Care Visit  [facility]  Phillip Bradshaw MD, MD, Internal Medicine  Nov 4, 2024      Verito Bell is a 34 year old, presenting for the following:  Physical           HPI        Health Care Directive  Patient does not have a Health Care Directive: Discussed advance care planning with patient; however, patient declined at this time.      11/3/2024   General Health   How would you rate your overall physical health? Excellent   Feel stress (tense, anxious, or unable to sleep) Not at all            11/3/2024   Nutrition   Three or more servings of calcium each day? Yes   Diet: Vegetarian/vegan   How many servings of fruit and vegetables per day? 4 or more   How many sweetened beverages each day? 0-1            11/3/2024   Exercise   Days per week of moderate/strenous exercise 7 days   Average minutes spent exercising at this level 120 min            11/3/2024   Social Factors   Frequency of gathering with friends or relatives Once a week   Worry food won't last until get money to buy more No   Food not last or not have enough money for food? No   Do you have housing? (Housing is defined as stable permanent housing and does not include staying ouside in a car, in a tent, in an abandoned building, in an overnight shelter, or couch-surfing.) Yes   Are you worried about losing your housing? No   Lack of transportation? No   Unable to get utilities (heat,electricity)? No            11/3/2024   Dental   Dentist two times every year? Yes            11/3/2024   TB Screening   Were you born outside of the [place]? No            Today's PHQ-2 Score:       11/3/2024     9:30 AM   PHQ-2 ( 1999 [employer])   Q1: Little interest or pleasure in doing things 0    Q2: Feeling down, depressed or hopeless 0    PHQ-2 Score 0    Q1: Little interest or pleasure in doing things Not at all   Q2: Feeling down, depressed or hopeless Not at all   PHQ-2 Score 0       Patient-reported           11/3/2024    Substance Use   Alcohol more than 3/day or more than 7/wk Not Applicable   Do you use any other substances recreationally? No        Social History     Tobacco Use    Smoking status: Never    Smokeless tobacco: Never   Vaping Use    Vaping status: Never Used   Substance Use Topics    Alcohol use: Yes     Alcohol/week: 1.0 standard drink of alcohol     Types: 1 Standard drinks or equivalent per week    Drug use: No          Mammogram Screening - Patient under 40 years of age: Routine Mammogram Screening not recommended.         11/3/2024   STI Screening   New sexual partner(s) since last STI/HIV test? No        History of abnormal Pap smear: No - age 30-64 HPV with reflex Pap every 5 years recommended             11/3/2024   Contraception/Family Planning   Questions about contraception or family planning No           Reviewed and updated as needed this visit by Provider                    Past Medical History:   Diagnosis Date    Anemia     Overview:   1/12 hgb 11    Lyme arthritis of knee (H)     Overview:   -Follows with Dr. Matt Wilson and Dr. Gopal Nazario  -Left knee lyme arthritis diagnosed with positive PCR on synovial fluid along with positive serologies late October 2015. Completed 24 days of PO Doxy followed by IV ceftriaxone for 28 days  -Issues with tachycardia and fever thought secondary to Herxheimer Reaction. Resolved on Prednisone taper.    Lyme disease 2013    Uncomplicated asthma 1996     Nephrolithiasis   Arabella A Kaleigh did have follow up in urology and did pass stone.  Recommended a follow up in 1 year.  She is trying to avoid spinach - high oxalate foods.    Review of Systems  Constitutional, HEENT, cardiovascular, pulmonary, GI, , musculoskeletal, neuro, skin = itching lips, endocrine and psych systems are negative, except as otherwise noted.     Objective    Exam  /73 (BP Location: Right arm, Patient Position: Sitting, Cuff Size: Adult Regular)   Pulse 65   Temp 97.7  F (36.5  " C) (Tympanic)   Resp 16   Ht 1.77 m (5' 9.69\")   Wt 60 kg (132 lb 4.8 oz)   LMP 10/27/2024 (Approximate)   SpO2 100%   BMI 19.16 kg/m     Estimated body mass index is 19.16 kg/m  as calculated from the following:    Height as of this encounter: 1.77 m (5' 9.69\").    Weight as of this encounter: 60 kg (132 lb 4.8 oz).    Physical Exam  GENERAL: alert and no distress  EYES: Eyes grossly normal to inspection,    HENT: ear canals and TM's normal,   NECK: no adenopathy, no asymmetry, masses, or scars  RESP: lungs clear to auscultation - no rales, rhonchi or wheezes  CV: regular rate and rhythm, normal S1 S2, no S3 or S4, no murmur,   ABDOMEN: soft, nontender, no hepatosplenomegaly   MS: no gross musculoskeletal defects noted, no edema  SKIN: no suspicious lesions or rashes  NEURO: Normal strength and tone, mentation intact and speech normal  PSYCH: mentation appears normal, affect normal/bright    Patient Instructions   (Z00.00) Routine general medical examination at a health care facility  (primary encounter diagnosis)  Comment: For routine exam, we will draw labs as ordered, cholesterol, diabetes mellitus check, liver function, renal function.  We will also update vaccination history.  Plan:     (S73.191D) Tear of right acetabular labrum, subsequent encounter  Comment: Plan to follow up in orthopedics   Plan:     (J45.20) Mild intermittent asthma without complication  Comment: Continue albuterol as needed and recommend PCV-20  Plan:         Signed Electronically by: Phillip Bradshaw MD, MD    "

## 2024-11-04 NOTE — PATIENT INSTRUCTIONS
(Z00.00) Routine general medical examination at a health care facility  (primary encounter diagnosis)  Comment: For routine exam, we will draw labs as ordered, cholesterol, diabetes mellitus check, liver function, renal function.  We will also update vaccination history.  Plan:     (S73.191D) Tear of right acetabular labrum, subsequent encounter  Comment: Plan to follow up in orthopedics   Plan:     (J45.20) Mild intermittent asthma without complication  Comment: Continue albuterol as needed and recommend PCV-20  Plan:

## 2025-01-30 ENCOUNTER — TRANSCRIBE ORDERS (OUTPATIENT)
Dept: OTHER | Age: 35
End: 2025-01-30

## 2025-01-30 ENCOUNTER — OFFICE VISIT (OUTPATIENT)
Dept: FAMILY MEDICINE | Facility: CLINIC | Age: 35
End: 2025-01-30
Payer: COMMERCIAL

## 2025-01-30 ENCOUNTER — PATIENT OUTREACH (OUTPATIENT)
Dept: ONCOLOGY | Facility: CLINIC | Age: 35
End: 2025-01-30

## 2025-01-30 VITALS
HEART RATE: 64 BPM | WEIGHT: 134.6 LBS | RESPIRATION RATE: 16 BRPM | TEMPERATURE: 98 F | DIASTOLIC BLOOD PRESSURE: 74 MMHG | HEIGHT: 70 IN | OXYGEN SATURATION: 98 % | SYSTOLIC BLOOD PRESSURE: 110 MMHG | BODY MASS INDEX: 19.27 KG/M2

## 2025-01-30 DIAGNOSIS — Z80.3 FAMILY HISTORY OF MALIGNANT NEOPLASM OF BREAST: ICD-10-CM

## 2025-01-30 DIAGNOSIS — S73.191D TEAR OF RIGHT ACETABULAR LABRUM, SUBSEQUENT ENCOUNTER: ICD-10-CM

## 2025-01-30 DIAGNOSIS — Z87.19 HX OF HERNIA REPAIR: ICD-10-CM

## 2025-01-30 DIAGNOSIS — Z98.890 HX OF HERNIA REPAIR: ICD-10-CM

## 2025-01-30 DIAGNOSIS — Z01.818 PREOP GENERAL PHYSICAL EXAM: Primary | ICD-10-CM

## 2025-01-30 DIAGNOSIS — R29.91 MARFANOID HABITUS: ICD-10-CM

## 2025-01-30 DIAGNOSIS — I47.10 SVT (SUPRAVENTRICULAR TACHYCARDIA): ICD-10-CM

## 2025-01-30 DIAGNOSIS — N20.1 LEFT URETERAL STONE: ICD-10-CM

## 2025-01-30 DIAGNOSIS — Z80.3 FAMILY HISTORY OF MALIGNANT NEOPLASM OF BREAST: Primary | ICD-10-CM

## 2025-01-30 DIAGNOSIS — J45.20 MILD INTERMITTENT ASTHMA WITHOUT COMPLICATION: ICD-10-CM

## 2025-01-30 ASSESSMENT — PAIN SCALES - GENERAL: PAINLEVEL_OUTOF10: NO PAIN (0)

## 2025-01-30 NOTE — PROGRESS NOTES
Preoperative Evaluation  [facility]  [address] [facility], [address]  [facility] [address]  Phone: [phone]  Primary Provider: Phillip Bradshaw MD, MD  Pre-op Performing Provider: Juanita Faust MD  Jan 30, 2025 1/29/2025   Surgical Information   What procedure is being done? right hip arthroscopy labral tear/EMY surgery   Facility or Hospital where procedure/surgery will be performed: [facility] [place]   Who is doing the procedure / surgery? Dr. Stephen Fall   Date of surgery / procedure: 2/7/25   Time of surgery / procedure: AM   Where do you plan to recover after surgery? at home with family     Fax number for surgical facility: [phone]    Assessment & Plan     The proposed surgical procedure is considered INTERMEDIATE risk.    Preop general physical exam  This is a very nice 34 years old who needs a preop physical and is cleared for this surgery there is higher perioperative risk for  PSVT  She will stop nonsteroidals and omega-3 fish oil prior to surgery    Tear of right acetabular labrum, subsequent encounter  As above    Mild intermittent asthma without complication  Very stable    Hx of hernia repair  Patient has bilateral inguinal hernia repair, he is 5 9 and very thin and she is marfanoid  She should be checked for that because of bodyaches    Left ureteral stone  We discussed about calcium oxalate stone prevention in 24 hours urine supersaturation should be done    SVT (supraventricular tachycardia)  Remains in remission but ablation should be offered if symptoms persist    Family history of malignant neoplasm of breast  7-8 family members  - Adult Genetics & Metabolism  Referral    Marfanoid habitus  As above  - Adult Genetics & Metabolism  Referral                     Recommendation  Approval given to proceed with proposed procedure, without further diagnostic evaluation.    Subjective   Arabella is a 34  year old, presenting for the following:  Pre-Op Exam          1/30/2025     8:16 AM   Additional Questions   Roomed by Eulogio   Accompanied by Not applicable, by themselves     HPI related to upcoming procedure: This is a very nice 34 years old patient who had repair of the left labrum in the hip in the past and now needs for the right hip    She has bodyaches all over normally and has history of kidney stone and PSVT  She has moved from [place] to [place] for her job    She also had a left ureteric stone recently    Everything is stable other than her body aches        1/29/2025   Pre-Op Questionnaire   Have you ever had a heart attack or stroke? No   Have you ever had surgery on your heart or blood vessels, such as a stent placement, a coronary artery bypass, or surgery on an artery in your head, neck, heart, or legs? No   Do you have chest pain with activity? No   Do you have a history of heart failure? No   Do you currently have a cold, bronchitis or symptoms of other infection? No   Do you have a cough, shortness of breath, or wheezing? No   Do you or anyone in your family have previous history of blood clots? (!) YES self, superficial vein    Do you or does anyone in your family have a serious bleeding problem such as prolonged bleeding following surgeries or cuts? No   Have you ever had problems with anemia or been told to take iron pills? (!) YES took iron pills   Have you had any abnormal blood loss such as black, tarry or bloody stools, or abnormal vaginal bleeding? No   Have you ever had a blood transfusion? No   Are you willing to have a blood transfusion if it is medically needed before, during, or after your surgery? Yes   Have you or any of your relatives ever had problems with anesthesia? (!) YES allergy in family succinylcholine   Do you have sleep apnea, excessive snoring or daytime drowsiness? No   Do you have any artifical heart valves or other implanted medical devices like a  pacemaker, defibrillator, or continuous glucose monitor? No   Do you have artificial joints? No   Are you allergic to latex? No     Health Care Directive  Patient does not have a Health Care Directive: Discussed advance care planning with patient; however, patient declined at this time.    Preoperative Review of    reviewed - no record of controlled substances prescribed.          Patient Active Problem List    Diagnosis Date Noted    Left ureteral stone 01/30/2025     Priority: Medium    Mild intermittent asthma without complication 01/30/2025     Priority: Medium    Tear of right acetabular labrum, subsequent encounter 11/04/2024     Priority: Medium    Hx of hernia repair 11/01/2023     Priority: Medium    Allergic rhinitis 11/12/2017     Priority: Medium    SVT (supraventricular tachycardia) 01/06/2016     Priority: Medium    Asthma with severity to be determined 01/11/1999     Priority: Medium     Overview:   ICD-10 update of inactive term        Past Medical History:   Diagnosis Date    Anemia     Overview:   1/12 hgb 11    Lyme arthritis of knee (H)     Overview:   -Follows with Dr. Matt Wilson and Dr. Gopal Nazario  -Left knee lyme arthritis diagnosed with positive PCR on synovial fluid along with positive serologies late October 2015. Completed 24 days of PO Doxy followed by IV ceftriaxone for 28 days  -Issues with tachycardia and fever thought secondary to Herxheimer Reaction. Resolved on Prednisone taper.    Lyme disease 2013    Uncomplicated asthma 1996     Past Surgical History:   Procedure Laterality Date    CHOLECYSTECTOMY  2016    CHOLECYSTOSTOMY  2016    HC TOOTH EXTRACTION W/FORCEP      ORTHOPEDIC SURGERY  2018    left hip labral repair     Current Outpatient Medications   Medication Sig Dispense Refill    albuterol (PROAIR HFA/PROVENTIL HFA/VENTOLIN HFA) 108 (90 Base) MCG/ACT inhaler Inhale 2 puffs into the lungs every 4 hours as needed for shortness of breath, wheezing or cough. 18 g 3     desonide (DESOWEN) 0.05 % external ointment Apply topically. Apply to lips 2 times a day for up to 6 weeks.      multivitamin, therapeutic with minerals (MULTI-VITAMIN) TABS tablet Take 1 tablet by mouth daily      Omega-3 Fatty Acids (FISH OIL CONCENTRATE PO)       ondansetron (ZOFRAN ODT) 4 MG ODT tab Take 1 tablet (4 mg) by mouth every 8 hours as needed for nausea or vomiting. 10 tablet 0    Probiotic Product (PROBIOTIC ADVANCED PO)          Allergies   Allergen Reactions    Covid-19 Ad26 Vaccine([employer]ack) Other (See Comments)     Superficial thrombophlebitis    Doxycycline      Toenail and fingernail briltle     Succinylcholine Other (See Comments)     Runs in the family-Patient states it causes difficulty waking up from anest        Social History     Tobacco Use    Smoking status: Never    Smokeless tobacco: Never   Substance Use Topics    Alcohol use: Yes     Alcohol/week: 1.0 standard drink of alcohol     Types: 1 Standard drinks or equivalent per week     Family History   Problem Relation Age of Onset    Hypertension Mother     Vasculitis Father     Heart Defect Brother         SURGERY    Breast Cancer Paternal Grandmother 60         at 67    Breast Cancer Maternal Aunt 45        maternal great aunt    Breast Cancer Paternal Aunt 49         at 49    Breast Cancer Paternal Aunt 65         at 69    Breast Cancer Cousin 40        maternal first cousin    Cervical Cancer Cousin 41    Pancreatic Cancer Other 79        maternal great granmother    Breast Cancer Other         paternal great aunt    Prostate Cancer Other 75        paternal great grandfather;  at 76    Autoimmune Disease No family hx of      History   Drug Use No             Review of Systems  Constitutional, HEENT, cardiovascular, pulmonary, GI, , musculoskeletal, neuro, skin, endocrine and psych systems are negative, except as otherwise noted.    Objective    /74 (BP Location: Left arm, Patient Position: Sitting, Cuff  "Size: Adult Regular)   Pulse 64   Temp 98  F (36.7  C) (Temporal)   Resp 16   Ht 1.768 m (5' 9.6\")   Wt 61.1 kg (134 lb 9.6 oz)   LMP 01/09/2025 (Approximate)   SpO2 98%   BMI 19.54 kg/m     Estimated body mass index is 19.54 kg/m  as calculated from the following:    Height as of this encounter: 1.768 m (5' 9.6\").    Weight as of this encounter: 61.1 kg (134 lb 9.6 oz).  Physical Exam  GENERAL: alert and no distress  NECK: no adenopathy, no asymmetry, masses, or scars  RESP: lungs clear to auscultation - no rales, rhonchi or wheezes  CV: regular rate and rhythm, normal S1 S2, no S3 or S4, no murmur, click or rub, no peripheral edema  ABDOMEN: soft, nontender, no hepatosplenomegaly, no masses and bowel sounds normal  MS: no gross musculoskeletal defects noted, no edema    Recent Labs   Lab Test 09/05/24  1142 09/05/24  0752 04/19/24  1146   HGB  --  12.6 13.8   PLT  --  186 198     --  138   POTASSIUM 4.3  --  4.2   CR 0.66  --  0.67        Diagnostics  No labs were ordered during this visit.   EKG not required at this age    Revised Cardiac Risk Index (RCRI)  The patient has the following serious cardiovascular risks for perioperative complications:   - No serious cardiac risks = 0 points     RCRI Interpretation: 0 points: Class I (very low risk - 0.4% complication rate)         Signed Electronically by: Juanita Faust MD  A copy of this evaluation report is provided to the requesting physician.         "

## 2025-01-30 NOTE — PROGRESS NOTES
New Patient Oncology Nurse Navigator Note     Referring provider: Juanita Faust MD      Referring Clinic/Organization: [facility]      Referred to (specialty:) Genetic Counseling and Cancer Risk Management     Requested provider (if applicable): NA     Date Referral Received: 2025     Evaluation for:  Z80.3 (ICD-10-CM) - Family history of malignant neoplasm of breast   referral retranscribed from general genetics, pt previously saw gc and jenni ruth in 2017.    Family History  Pedigree Relation Problem Comments   Mother - Kimberlee Hypertension       Father Vasculitis       Paternal Grandmother - Leah () Breast Cancer (Age: 60)  at 67      Brother (Alive) Heart Defect SURGERY      Brother (Alive)    Cousin - Stephany Breast Cancer (Age: 40) maternal first cousin   Cervical Cancer (Age: 41)       Other () Pancreatic Cancer (Age: 79) maternal great granmother      Other - Paternal Aunt (Alive) Breast Cancer paternal great aunt      Other () Prostate Cancer (Age: 75) paternal great grandfather;  at 76      Maternal Aunt (Alive) Breast Cancer (Age: 45) maternal great aunt      Paternal Aunt () Breast Cancer (Age: 49)  at 49      Paternal Aunt () Breast Cancer (Age: 65)  at 69          Patient seen by Tete BERNAL CNS on .    ASSESSMENT  We discussed because the youngest person with breast cancer in her family was 40, it would be reasonable to start screening between 30-39; she is interested in starting when she is 30 and will contact me again at that time.  We discussed breast health, breast concerns and signs and symptoms to be watchful for. She practiced palpating with the clinic's breast simulation model and verbalized understanding of signs and symptoms to address with her health care providers including lumps, thickening, swelling, tenderness, nipple discharge or changes in skin of breasts.    Payor: [facility] /  Plan: [facility] FEDERAL EMPLOYEE PROGRAM / Product Type: PPO /     January 30, 2025  Referral received and reviewed.   Sent to NPS to schedule.     Jadyn RUDDN, RN, OCN  Oncology Nurse Navigator   [facility]  Cancer [facility] Service Line   New Patient Hem/Onc Scheduling / Referrals: [phone] (fax: [phone] )

## 2025-01-31 NOTE — PROGRESS NOTES
Oncology Risk Management Consultation:  Date on this visit: 2025    Arabella Farris returns to the Cancer Risk Management Program for an oncology risk management consultation. She requires evaluation for her risk of cancer secondary to having a family history of breast cancer in a maternal first cousin at 40, maternal great aunt at 45, paternal aunt at 49, paternal grandmother at 60 and several other relatives. She is considered to at high risk for breast cancer and has a 24.7% lifetime risk for breast cancer by the MARINA model. She is here today with her partner, Ish.      Primary Physician: Phillip Bradshaw MD    History Of Present Illness:  Ms. Farris is a very pleasant, healthy 34 year old female who presents with family history of breast cancer.     Genetic testin2017 - NEGATIVE for genetic mutations in 18 genes associated with breast cancer including GILBERT, BARD1, BRCA1, BRCA2, BRIP1, CDH1, CHEK2, MRE11A, MUTYH, NBN, NF1, PALB2, PTEN, RAD50, RAD51C, RAD51D, STK11, and TP53 genes by sequencing and deletion/duplication analysis. The testing was done using a Breast Expanded panel through the [facility].     Pertinent history:  Menarche at 13.  Nulliparous.  Premenopausal.  Ovaries, fallopian tubes and uterus in place.  Hx of OCPs x10 years.  No hx of HRT     Pertinent screening history:  2015 - Left breast Mammogram ultrasound for a palpable lump she identified. Category 1, negative. Dense breast tissue noted.  [facility], [place], [place])       At this visit, she denies new fatigue, breast pain, asymmetry, lumps, masses, thickening, nipple discharge and skin changes in her breasts.       Past Medical/Surgical History:  Past Medical History:   Diagnosis Date    Anemia     Overview:    hgb 11    Lyme arthritis of knee (H)     Overview:   -Follows with Dr. Matt Wilson and Dr. Gopal Nazario  -Left knee lyme  arthritis diagnosed with positive PCR on synovial fluid along with positive serologies late 2015. Completed 24 days of PO Doxy followed by IV ceftriaxone for 28 days  -Issues with tachycardia and fever thought secondary to Herxheimer Reaction. Resolved on Prednisone taper.    Lyme disease     Uncomplicated asthma 1996     Past Surgical History:   Procedure Laterality Date    CHOLECYSTECTOMY  2016    CHOLECYSTOSTOMY  2016    HC TOOTH EXTRACTION W/FORCEP      ORTHOPEDIC SURGERY  2018    left hip labral repair       Allergies:  Allergies as of 2025 - Reviewed 2025   Allergen Reaction Noted    Covid-19 ad26 vaccine(Kismet) Other (See Comments) 2023    Doxycycline  2024    Succinylcholine Other (See Comments) 2023       Current Medications:  Current Outpatient Medications   Medication Sig Dispense Refill    albuterol (PROAIR HFA/PROVENTIL HFA/VENTOLIN HFA) 108 (90 Base) MCG/ACT inhaler Inhale 2 puffs into the lungs every 4 hours as needed for shortness of breath, wheezing or cough. 18 g 3    Calcium Carbonate Antacid (PRICILA-SELTZER ANTACID PO) Take by mouth daily as needed.      desonide (DESOWEN) 0.05 % external ointment Apply topically. Apply to lips 2 times a day for up to 6 weeks.      multivitamin, therapeutic with minerals (MULTI-VITAMIN) TABS tablet Take 1 tablet by mouth daily      Omega-3 Fatty Acids (FISH OIL CONCENTRATE PO)       ondansetron (ZOFRAN ODT) 4 MG ODT tab Take 1 tablet (4 mg) by mouth every 8 hours as needed for nausea or vomiting. 10 tablet 0    polyethylene glycol (MIRALAX) 17 GM/Dose powder Take by mouth. PRN      Probiotic Product (PROBIOTIC ADVANCED PO)           Family History:  Family History   Problem Relation Age of Onset    Hypertension Mother     Vasculitis Father     Heart Defect Brother         SURGERY    Lung Cancer Maternal Grandfather     Breast Cancer Paternal Grandmother 60         at 67    Breast Cancer Paternal Aunt 49         at  49    Breast Cancer Paternal Aunt 65         at 69    Pancreatic Cancer Paternal Aunt 78    Breast Cancer Maternal Cousin 40        Stage 4, metastatic breast cancer    Cervical Cancer Maternal Cousin 41    Pancreatic Cancer Other 79        maternal great granmother    Breast Cancer Other         paternal great aunt    Prostate Cancer Other 75        paternal great grandfather;  at 76    Autoimmune Disease No family hx of        Social History:  Social History     Socioeconomic History    Marital status:      Spouse name: Not on file    Number of children: 0    Years of education: Not on file    Highest education level: Not on file   Occupational History     Employer: STUDENT     Comment: [employer]   Tobacco Use    Smoking status: Never    Smokeless tobacco: Never   Vaping Use    Vaping status: Never Used   Substance and Sexual Activity    Alcohol use: Not Currently     Alcohol/week: 1.0 standard drink of alcohol     Types: 1 Standard drinks or equivalent per week    Drug use: No    Sexual activity: Yes     Partners: Male     Birth control/protection: Condom   Other Topics Concern    Parent/sibling w/ CABG, MI or angioplasty before 65F 55M? 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     Social Drivers of Health     Financial Resource Strain: Low Risk  (11/3/2024)    Financial Resource Strain     Within the past 12 months, have you or your family members you live with been unable to get utilities (heat, electricity) when it was really needed?: No   Food Insecurity: Low Risk  (11/3/2024)    Food Insecurity     Within the past 12 months, did you worry that your food would run out before you got money to buy more?: No     Within the past 12 months, did the food you bought just not last and you didn t have money to get more?: No   Transportation Needs: Low Risk  (11/3/2024)    Transportation Needs     Within the past 12 months, has lack of transportation  "kept you from medical appointments, getting your medicines, non-medical meetings or appointments, work, or from getting things that you need?: No   Physical Activity: Sufficiently Active (11/3/2024)    Exercise Vital Sign     Days of Exercise per Week: 7 days     Minutes of Exercise per Session: 120 min   Stress: No Stress Concern Present (11/3/2024)    [place] [employer] - Occupational Stress Questionnaire     Feeling of Stress : Not at all   Social Connections: Unknown (11/3/2024)    Social Connection and Isolation Panel [NHANES]     Frequency of Communication with Friends and Family: Not on file     Frequency of Social Gatherings with Friends and Family: Once a week     Attends Rastafari Services: Not on file     Active Member of Clubs or Organizations: Not on file     Attends Club or Organization Meetings: Not on file     Marital Status: Not on file   Interpersonal Safety: Low Risk  (11/4/2024)    Interpersonal Safety     Do you feel physically and emotionally safe where you currently live?: Yes     Within the past 12 months, have you been hit, slapped, kicked or otherwise physically hurt by someone?: No     Within the past 12 months, have you been humiliated or emotionally abused in other ways by your partner or ex-partner?: No   Housing Stability: Low Risk  (11/3/2024)    Housing Stability     Do you have housing? : Yes     Are you worried about losing your housing?: No       Physical Exam:  /82 (BP Location: Right arm, Patient Position: Sitting, Cuff Size: Adult Regular)   Pulse 61   Temp 97.4  F (36.3  C) (Oral)   Resp 18   Ht 1.765 m (5' 9.5\")   Wt 61.2 kg (134 lb 14.4 oz)   LMP 01/09/2025 (Approximate)   SpO2 99%   BMI 19.64 kg/m    GENERAL APPEARANCE: healthy, alert and no apparent distress  BREAST: A multipositional, bilateral breast exam was performed. Fairly symmetrical. Nipples everted bilaterally. Right breast: Palpable, mobile lump upper outer quadrant, no nipple " discharge, no skin changes. Dense tissue.  Right axilla: no palpable adenopathy. Left breast: Palpable, mobile lump upper outer quadrant,, no nipple discharge, no skin changes. Left axilla: no palpable adenopathy. Dense tissue.    LYMPHATICS: No cervical, supraclavicular, or axillary lymphadenopathy  SKIN: no suspicious lesions or rashes on examined skin    Laboratory/Imaging Studies  No results found for any visits on 02/04/25.    ASSESSMENT    Arabella comes to the Cancer Risk Management Program to discuss recommendations based on her increased risk of developing breast cancer. We discussed her risk, which is estimated to be 24.7% by the MARINA model. We discussed her personal and family history, and breast cancer risk factors. We discussed the recommendation for an annual mammogram alternating with an annual breast MRI, starting 10 years younger than the earliest breast cancer in the family. Her cousin was diagnosed with breast cancer at age 40. We discussed the benefits and limitations of both imaging modalities, the recommendation to space them six months apart, and that insurance may or may not cover then entirely. Arabella was encouraged to check with her insurance company on coverage prior to the breast MRI.     Arabella reports passing a kidney stone after the last MRI with contrast that she had. She wonders if the contrast was related. She did not have any signs of an allergic reaction, such as throat swelling or rashes. She is not sure if she wants to proceed with high risk breast screening with an annual breast MRI with contrast, but will think more about it and let me know.     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 She hasn't seen a genetic counselor since 2017. She wants to re-establish care with a genetic counselor to inquire if any updated genetic testing would be recommended. Referral placed.     Her exam today was  positive for a palpable lump in both breasts, upper/outer quadrants. We will plan for a diagnostic mammogram and US. I would then recommend a breast MRI in August, and for her to return to see me in February, 2026 with a mammogram following our visit.     We discussed concerns and symptoms to be watchful for between visits, including breast lumps, bumps, nipple inversion, nipple discharge and any changes in skin including crinkled or puckered skin. We discussed the recommendation for breast self awareness, and discussed techniques for a self breast exam.       Individualized Surveillance Plan for women  With 20% or greater lifetime risk of breast cancer   Per NCCN Breast Cancer Screening and Diagnosis Guidelines Version 2.2024   Recommended screening Test or procedure Last done Next Scheduled    Clinical encounter Clinical exam every 6-12 months.   Refer to genetic counseling if not already done.  Consider risk reduction strategies.   February 2025 February 2026   However, some family histories with breast cancers at a very young age, may warrant screening starting earlier.    *May begin at age 40 if breast cancers in the family occur at later ages.    Annual mammogram beginning 10 years younger than the earliest breast cancer in the family but not prior to age 30.    Recommend annual breast MRI to begin 10 years younger than the earliest breast cancer in the family but not prior to age 25.    Breast MRIs are preferably done on day 7-15 of the menstrual cycle in premenopausal women.   No screening to date.    Diagnostic mammogram and US soon    Breast MRI in August    Return to clinic in February, 2026 with a mammogram following our visit   Breast screening for patients at high risk due to thoracic radiation between the ages of 10-30   Annual clinical exam beginning 8 years after radiation therapy.    Annual screening mammogram beginning at age 30 or 8 years after radiation therapy    Annual breast MRI, beginning  at age 25 or 8 years after radiation therapy.     NA   NA   Women who have a lifetime risk of >20% based on history of LCIS or ADH/ALH Annual screening mammogram beginning at age of LCIS or ADH/ALH but not prior to age 30.    Consider annual MRI to begin at age of diagnosis of LCIS or ADH/ALH but not prior to age 25.    Consider risk reducing strategies.   NA   NA    Recommend risk reducing strategies for women with 1.7% 5 year risk of breast cancer.           I spent a total of 60 minutes on the day of the visit. Please see the note for further information on patient assessment and treatment.     Lolis Rushing, STEF, APRN, AGCNS-BC  Clinical Nurse Specialist  Cancer Risk Management Program  [facility]    Cc:  MD Juanita Carpenter MD

## 2025-02-04 ENCOUNTER — PATIENT OUTREACH (OUTPATIENT)
Dept: ONCOLOGY | Facility: CLINIC | Age: 35
End: 2025-02-04

## 2025-02-04 ENCOUNTER — ONCOLOGY VISIT (OUTPATIENT)
Dept: ONCOLOGY | Facility: CLINIC | Age: 35
End: 2025-02-04
Attending: INTERNAL MEDICINE
Payer: COMMERCIAL

## 2025-02-04 VITALS
TEMPERATURE: 97.4 F | HEIGHT: 70 IN | WEIGHT: 134.9 LBS | OXYGEN SATURATION: 99 % | HEART RATE: 61 BPM | BODY MASS INDEX: 19.31 KG/M2 | RESPIRATION RATE: 18 BRPM | DIASTOLIC BLOOD PRESSURE: 82 MMHG | SYSTOLIC BLOOD PRESSURE: 125 MMHG

## 2025-02-04 DIAGNOSIS — N63.21 MASS OF UPPER OUTER QUADRANT OF LEFT BREAST: ICD-10-CM

## 2025-02-04 DIAGNOSIS — Z12.39 BREAST CANCER SCREENING, HIGH RISK PATIENT: Primary | ICD-10-CM

## 2025-02-04 DIAGNOSIS — Z80.3 FAMILY HISTORY OF BREAST CANCER: ICD-10-CM

## 2025-02-04 DIAGNOSIS — N63.11 MASS OF UPPER OUTER QUADRANT OF RIGHT BREAST: ICD-10-CM

## 2025-02-04 PROCEDURE — 99205 OFFICE O/P NEW HI 60 MIN: CPT

## 2025-02-04 PROCEDURE — 99213 OFFICE O/P EST LOW 20 MIN: CPT

## 2025-02-04 RX ORDER — POLYETHYLENE GLYCOL 3350 17 G/17G
POWDER, FOR SOLUTION ORAL
COMMUNITY
Start: 2023-12-29

## 2025-02-04 ASSESSMENT — PAIN SCALES - GENERAL: PAINLEVEL_OUTOF10: MILD PAIN (2)

## 2025-02-04 NOTE — LETTER
2025      Arabella Farris  [address]  [facility] [address]      Dear Colleague,    Thank you for referring your patient, Arabella Farris, to the [facility]. Please see a copy of my visit note below.    Oncology Risk Management Consultation:  Date on this visit: 2025    Arabella Farris returns to the Cancer Risk Management Program for an oncology risk management consultation. She requires evaluation for her risk of cancer secondary to having a family history of breast cancer in a maternal first cousin at 40, maternal great aunt at 45, paternal aunt at 49, paternal grandmother at 60 and several other relatives. She is considered to at high risk for breast cancer and has a 24.7% lifetime risk for breast cancer by the MARINA model. She is here today with her partner, Ish.      Primary Physician: Phillip Bradshaw MD    History Of Present Illness:  Ms. Farris is a very pleasant, healthy 34 year old female who presents with family history of breast cancer.     Genetic testin2017 - NEGATIVE for genetic mutations in 18 genes associated with breast cancer including GILBERT, BARD1, BRCA1, BRCA2, BRIP1, CDH1, CHEK2, MRE11A, MUTYH, NBN, NF1, PALB2, PTEN, RAD50, RAD51C, RAD51D, STK11, and TP53 genes by sequencing and deletion/duplication analysis. The testing was done using a Breast Expanded panel through the [facility].     Pertinent history:  Menarche at 13.  Nulliparous.  Premenopausal.  Ovaries, fallopian tubes and uterus in place.  Hx of OCPs x10 years.  No hx of HRT     Pertinent screening history:  2015 - Left breast Mammogram ultrasound for a palpable lump she identified. Category 1, negative. Dense breast tissue noted.  ([email or web address], [place], [place])       At this visit, she denies new fatigue, breast pain, asymmetry, lumps, masses, thickening, nipple discharge and skin changes in  her breasts.       Past Medical/Surgical History:  Past Medical History:   Diagnosis Date     Anemia     Overview:   1/12 hgb 11     Lyme arthritis of knee (H)     Overview:   -Follows with Dr. Matt Wilson and Dr. Gopal Nazario  -Left knee lyme arthritis diagnosed with positive PCR on synovial fluid along with positive serologies late October 2015. Completed 24 days of PO Doxy followed by IV ceftriaxone for 28 days  -Issues with tachycardia and fever thought secondary to Herxheimer Reaction. Resolved on Prednisone taper.     Lyme disease 2013     Uncomplicated asthma 1996     Past Surgical History:   Procedure Laterality Date     CHOLECYSTECTOMY  2016     CHOLECYSTOSTOMY  2016     HC TOOTH EXTRACTION W/FORCEP       ORTHOPEDIC SURGERY  2018    left hip labral repair       Allergies:  Allergies as of 02/04/2025 - Reviewed 02/04/2025   Allergen Reaction Noted     Covid-19 ad26 vaccine(Xinrong) Other (See Comments) 11/01/2023     Doxycycline  11/04/2024     Succinylcholine Other (See Comments) 08/14/2023       Current Medications:  Current Outpatient Medications   Medication Sig Dispense Refill     albuterol (PROAIR HFA/PROVENTIL HFA/VENTOLIN HFA) 108 (90 Base) MCG/ACT inhaler Inhale 2 puffs into the lungs every 4 hours as needed for shortness of breath, wheezing or cough. 18 g 3     Calcium Carbonate Antacid (PRICILA-SELTZER ANTACID PO) Take by mouth daily as needed.       desonide (DESOWEN) 0.05 % external ointment Apply topically. Apply to lips 2 times a day for up to 6 weeks.       multivitamin, therapeutic with minerals (MULTI-VITAMIN) TABS tablet Take 1 tablet by mouth daily       Omega-3 Fatty Acids (FISH OIL CONCENTRATE PO)        ondansetron (ZOFRAN ODT) 4 MG ODT tab Take 1 tablet (4 mg) by mouth every 8 hours as needed for nausea or vomiting. 10 tablet 0     polyethylene glycol (MIRALAX) 17 GM/Dose powder Take by mouth. PRN       Probiotic Product (PROBIOTIC ADVANCED PO)           Family History:  Family  History   Problem Relation Age of Onset     Hypertension Mother      Vasculitis Father      Heart Defect Brother         SURGERY     Lung Cancer Maternal Grandfather      Breast Cancer Paternal Grandmother 60         at 67     Breast Cancer Paternal Aunt 49         at 49     Breast Cancer Paternal Aunt 65         at 69     Pancreatic Cancer Paternal Aunt 78     Breast Cancer Maternal Cousin 40        Stage 4, metastatic breast cancer     Cervical Cancer Maternal Cousin 41     Pancreatic Cancer Other 79        maternal great granmother     Breast Cancer Other         paternal great aunt     Prostate Cancer Other 75        paternal great grandfather;  at 76     Autoimmune Disease No family hx of        Social History:  Social History     Socioeconomic History     Marital status:      Spouse name: Not on file     Number of children: 0     Years of education: Not on file     Highest education level: Not on file   Occupational History     Employer: STUDENT     Comment: [employer]   Tobacco Use     Smoking status: Never     Smokeless tobacco: Never   Vaping Use     Vaping status: Never Used   Substance and Sexual Activity     Alcohol use: Not Currently     Alcohol/week: 1.0 standard drink of alcohol     Types: 1 Standard drinks or equivalent per week     Drug use: No     Sexual activity: Yes     Partners: Male     Birth control/protection: Condom   Other Topics Concern     Parent/sibling w/ CABG, MI or angioplasty before 65F 55M? 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     Social Drivers of Health     Financial Resource Strain: Low Risk  (11/3/2024)    Financial Resource Strain      Within the past 12 months, have you or your family members you live with been unable to get utilities (heat, electricity) when it was really needed?: No   Food Insecurity: Low Risk  (11/3/2024)    Food Insecurity      Within the past 12 months, did you worry that  "your food would run out before you got money to buy more?: No      Within the past 12 months, did the food you bought just not last and you didn t have money to get more?: No   Transportation Needs: Low Risk  (11/3/2024)    Transportation Needs      Within the past 12 months, has lack of transportation kept you from medical appointments, getting your medicines, non-medical meetings or appointments, work, or from getting things that you need?: No   Physical Activity: Sufficiently Active (11/3/2024)    Exercise Vital Sign      Days of Exercise per Week: 7 days      Minutes of Exercise per Session: 120 min   Stress: No Stress Concern Present (11/3/2024)    [employer] [employer] - Occupational Stress Questionnaire      Feeling of Stress : Not at all   Social Connections: Unknown (11/3/2024)    Social Connection and Isolation Panel [NHANES]      Frequency of Communication with Friends and Family: Not on file      Frequency of Social Gatherings with Friends and Family: Once a week      Attends Church Services: Not on file      Active Member of Clubs or Organizations: Not on file      Attends Club or Organization Meetings: Not on file      Marital Status: Not on file   Interpersonal Safety: Low Risk  (11/4/2024)    Interpersonal Safety      Do you feel physically and emotionally safe where you currently live?: Yes      Within the past 12 months, have you been hit, slapped, kicked or otherwise physically hurt by someone?: No      Within the past 12 months, have you been humiliated or emotionally abused in other ways by your partner or ex-partner?: No   Housing Stability: Low Risk  (11/3/2024)    Housing Stability      Do you have housing? : Yes      Are you worried about losing your housing?: No       Physical Exam:  /82 (BP Location: Right arm, Patient Position: Sitting, Cuff Size: Adult Regular)   Pulse 61   Temp 97.4  F (36.3  C) (Oral)   Resp 18   Ht 1.765 m (5' 9.5\")   Wt 61.2 kg (134 " lb 14.4 oz)   LMP 01/09/2025 (Approximate)   SpO2 99%   BMI 19.64 kg/m    GENERAL APPEARANCE: healthy, alert and no apparent distress  BREAST: A multipositional, bilateral breast exam was performed. Fairly symmetrical. Nipples everted bilaterally. Right breast: Palpable, mobile lump upper outer quadrant, no nipple discharge, no skin changes. Dense tissue.  Right axilla: no palpable adenopathy. Left breast: Palpable, mobile lump upper outer quadrant,, no nipple discharge, no skin changes. Left axilla: no palpable adenopathy. Dense tissue.    LYMPHATICS: No cervical, supraclavicular, or axillary lymphadenopathy  SKIN: no suspicious lesions or rashes on examined skin    Laboratory/Imaging Studies  No results found for any visits on 02/04/25.    ASSESSMENT    Arabella comes to the Cancer Risk Management Program to discuss recommendations based on her increased risk of developing breast cancer. We discussed her risk, which is estimated to be 24.7% by the MARINA model. We discussed her personal and family history, and breast cancer risk factors. We discussed the recommendation for an annual mammogram alternating with an annual breast MRI, starting 10 years younger than the earliest breast cancer in the family. Her cousin was diagnosed with breast cancer at age 40. We discussed the benefits and limitations of both imaging modalities, the recommendation to space them six months apart, and that insurance may or may not cover then entirely. Arabella was encouraged to check with her insurance company on coverage prior to the breast MRI.     Arabella reports passing a kidney stone after the last MRI with contrast that she had. She wonders if the contrast was related. She did not have any signs of an allergic reaction, such as throat swelling or rashes. She is not sure if she wants to proceed with high risk breast screening with an annual breast MRI with contrast, but will think more about it and let me know.     Arabella has  questions about genetic testing she did through [employer], which identified a gene possibly associated with AML. She hasn't seen a genetic counselor since 2017. She wants to re-establish care with a genetic counselor to inquire if any updated genetic testing would be recommended. Referral placed.     Her exam today was positive for a palpable lump in both breasts, upper/outer quadrants. We will plan for a diagnostic mammogram and US. I would then recommend a breast MRI in August, and for her to return to see me in February, 2026 with a mammogram following our visit.     We discussed concerns and symptoms to be watchful for between visits, including breast lumps, bumps, nipple inversion, nipple discharge and any changes in skin including crinkled or puckered skin. We discussed the recommendation for breast self awareness, and discussed techniques for a self breast exam.       Individualized Surveillance Plan for women  With 20% or greater lifetime risk of breast cancer   Per NCCN Breast Cancer Screening and Diagnosis Guidelines Version 2.2024   Recommended screening Test or procedure Last done Next Scheduled    Clinical encounter Clinical exam every 6-12 months.   Refer to genetic counseling if not already done.  Consider risk reduction strategies.   February 2025 February 2026   However, some family histories with breast cancers at a very young age, may warrant screening starting earlier.    *May begin at age 40 if breast cancers in the family occur at later ages.    Annual mammogram beginning 10 years younger than the earliest breast cancer in the family but not prior to age 30.    Recommend annual breast MRI to begin 10 years younger than the earliest breast cancer in the family but not prior to age 25.    Breast MRIs are preferably done on day 7-15 of the menstrual cycle in premenopausal women.   No screening to date.    Diagnostic mammogram and US soon    Breast MRI in August    Return to clinic in February, 2026  with a mammogram following our visit   Breast screening for patients at high risk due to thoracic radiation between the ages of 10-30   Annual clinical exam beginning 8 years after radiation therapy.    Annual screening mammogram beginning at age 30 or 8 years after radiation therapy    Annual breast MRI, beginning at age 25 or 8 years after radiation therapy.     NA   NA   Women who have a lifetime risk of >20% based on history of LCIS or ADH/ALH Annual screening mammogram beginning at age of LCIS or ADH/ALH but not prior to age 30.    Consider annual MRI to begin at age of diagnosis of LCIS or ADH/ALH but not prior to age 25.    Consider risk reducing strategies.   NA   NA    Recommend risk reducing strategies for women with 1.7% 5 year risk of breast cancer.           I spent a total of 60 minutes on the day of the visit. Please see the note for further information on patient assessment and treatment.     Lolis Rushing DNP, GABE, [facility] Hospital[facility]-BC  Clinical Nurse Specialist  Cancer Risk Management Program  [facility]    Cc:  MD Juanita Carpenter MD        Again, thank you for allowing me to participate in the care of your patient.        Sincerely,        GABE Bejarano CNP    Electronically signed

## 2025-02-04 NOTE — PROGRESS NOTES
New Patient Oncology Nurse Navigator Note     Referring provider: Lolis Rushing APRN CNP      Referring Clinic/Organization: [facility]      Referred to (specialty:) Genetic Counseling and Cancer Risk Management     Requested provider (if applicable): Kerrie Aleman GC      Date Referral Received: February 4, 2025     Evaluation for:      Genetic testing in 2017, wants to re-establish with a genetic counselor to discuss [employer] results.    Z12.39 (ICD-10-CM) - Breast cancer screening, high risk patient   Z80.3 (ICD-10-CM) - Family history of breast cancer     Patient seen by Lolis Rushing CNP today. Per Lolis:  Arabella has questions about genetic testing she did through [employer], which identified a gene possibly associated with AML. She hasn't seen a genetic counselor since 2017. She wants to re-establish care with a genetic counselor to inquire if any updated genetic testing would be recommended. Referral placed.     Payor: [employer] / Plan: [employer] FEDERAL [employer]EE PROGRAM / Product Type: PPO /     February 4, 2025  Referral received and reviewed.   Sent to NPS to schedule.     Jadyn RUDDN, RN, OCN  Oncology Nurse Navigator   [facility]  Cancer Care Service Line   New Patient Hem/Onc Scheduling / Referrals: [phone] (fax: [phone] )

## 2025-02-04 NOTE — NURSING NOTE
"Oncology Rooming Note    February 4, 2025 9:27 AM   Arabella Farris is a 34 year old female who presents for:    Chief Complaint   Patient presents with    Oncology Clinic Visit     Family history of malignant neoplasm of breast     Initial Vitals: /82 (BP Location: Right arm, Patient Position: Sitting, Cuff Size: Adult Regular)   Pulse 61   Temp 97.4  F (36.3  C) (Oral)   Resp 18   Wt 61.2 kg (134 lb 14.4 oz)   LMP 01/09/2025 (Approximate)   SpO2 99%   BMI 19.58 kg/m   Estimated body mass index is 19.58 kg/m  as calculated from the following:    Height as of 1/30/25: 1.768 m (5' 9.6\").    Weight as of this encounter: 61.2 kg (134 lb 14.4 oz). Body surface area is 1.73 meters squared.  Mild Pain (2) Comment: Data Unavailable   Patient's last menstrual period was 01/09/2025 (approximate).  Allergies reviewed: Yes  Medications reviewed: Yes    Medications: Medication refills not needed today.  Pharmacy name entered into [employer]:    [id number] [address] [facility] [address] [place] [facility] [address] [place] [address]  [id number] [address] [facility]    Frailty Screening:   Is the patient here for a new oncology consult visit in cancer care? 1. Yes. Over the past month, have you experienced difficulty or required a caregiver to assist with:   1. Balance, walking or general mobility (including any falls)? NO  2. Completion of self-care tasks such as bathing, dressing, toileting, grooming/hygiene?  NO  3. Concentration or memory that affects your daily life?  NO       Clinical concerns: Patient would like a copy of her family history.        Esther Min              "

## 2025-02-04 NOTE — PATIENT INSTRUCTIONS
Individualized Surveillance Plan for women  With 20% or greater lifetime risk of breast cancer   Per NCCN Breast Cancer Screening and Diagnosis Guidelines Version 2.2024   Recommended screening Test or procedure Last done Next Scheduled    Clinical encounter Clinical exam every 6-12 months.   Refer to genetic counseling if not already done.  Consider risk reduction strategies.   February 2025 February 2026   However, some family histories with breast cancers at a very young age, may warrant screening starting earlier.    *May begin at age 40 if breast cancers in the family occur at later ages.    Annual mammogram beginning 10 years younger than the earliest breast cancer in the family but not prior to age 30.    Recommend annual breast MRI to begin 10 years younger than the earliest breast cancer in the family but not prior to age 25.    Breast MRIs are preferably done on day 7-15 of the menstrual cycle in premenopausal women.   No screening to date.    Mammogram soon    Breast MRI in August    Return to clinic in February, 2026 with a mammogram following our visit   Breast screening for patients at high risk due to thoracic radiation between the ages of 10-30   Annual clinical exam beginning 8 years after radiation therapy.    Annual screening mammogram beginning at age 30 or 8 years after radiation therapy    Annual breast MRI, beginning at age 25 or 8 years after radiation therapy.     NA   NA   Women who have a lifetime risk of >20% based on history of LCIS or ADH/ALH Annual screening mammogram beginning at age of LCIS or ADH/ALH but not prior to age 30.    Consider annual MRI to begin at age of diagnosis of LCIS or ADH/ALH but not prior to age 25.    Consider risk reducing strategies.   NA   NA    Recommend risk reducing strategies for women with 1.7% 5 year risk of breast cancer.

## 2025-02-12 ENCOUNTER — TELEPHONE (OUTPATIENT)
Dept: CONSULT | Facility: CLINIC | Age: 35
End: 2025-02-12
Payer: COMMERCIAL

## 2025-02-12 NOTE — TELEPHONE ENCOUNTER
LVM for patient to call back to schedule new patient Genetics appointment with Dr. Reeves, Dr. Lima, Dr. Dias, Dr. Wolff, or Dr. Harrell, with GC visit prior. When patient calls back, please assist in scheduling IN PERSON appointment.    Please advise patient to complete intake form via [employer],  as well as have outside records/previous genetic test reports sent prior to appointment date. Thank you.

## 2025-02-13 ENCOUNTER — TELEPHONE (OUTPATIENT)
Dept: CONSULT | Facility: CLINIC | Age: 35
End: 2025-02-13
Payer: COMMERCIAL

## 2025-02-13 NOTE — TELEPHONE ENCOUNTER
LVM for patient to call back to schedule new patient Genetics appointment with Dr. Reeves, Dr. Lima, Dr. Dias, Dr. Wolff, or Dr. Harrell, with GC visit prior. When patient calls back, please assist in scheduling IN PERSON appointment.    Please advise patient to complete intake form via [employer],  as well as have outside records/previous genetic test reports sent prior to appointment date. Thank you.

## 2025-02-17 ENCOUNTER — TRANSFERRED RECORDS (OUTPATIENT)
Dept: HEALTH INFORMATION MANAGEMENT | Facility: CLINIC | Age: 35
End: 2025-02-17
Payer: COMMERCIAL

## 2025-03-10 ENCOUNTER — HOSPITAL ENCOUNTER (OUTPATIENT)
Dept: MAMMOGRAPHY | Facility: CLINIC | Age: 35
Discharge: HOME | End: 2025-03-10
Payer: COMMERCIAL

## 2025-03-10 DIAGNOSIS — N63.21 MASS OF UPPER OUTER QUADRANT OF LEFT BREAST: ICD-10-CM

## 2025-03-10 DIAGNOSIS — Z12.39 BREAST CANCER SCREENING, HIGH RISK PATIENT: ICD-10-CM

## 2025-03-10 DIAGNOSIS — N63.11 MASS OF UPPER OUTER QUADRANT OF RIGHT BREAST: ICD-10-CM

## 2025-03-10 PROCEDURE — 76642 ULTRASOUND BREAST LIMITED: CPT | Mod: 50

## 2025-03-10 PROCEDURE — 77066 DX MAMMO INCL CAD BI: CPT

## 2025-06-16 ENCOUNTER — MYC MEDICAL ADVICE (OUTPATIENT)
Dept: FAMILY MEDICINE | Facility: CLINIC | Age: 35
End: 2025-06-16
Payer: COMMERCIAL

## 2025-06-16 DIAGNOSIS — Z34.90 PREGNANCY, UNSPECIFIED GESTATIONAL AGE: Primary | ICD-10-CM

## 2025-06-18 ENCOUNTER — TRANSFERRED RECORDS (OUTPATIENT)
Dept: HEALTH INFORMATION MANAGEMENT | Facility: CLINIC | Age: 35
End: 2025-06-18
Payer: COMMERCIAL

## 2025-07-02 PROBLEM — Z15.89 MTHFR GENE MUTATION: Status: ACTIVE | Noted: 2025-07-02

## 2025-07-15 ENCOUNTER — TRANSFERRED RECORDS (OUTPATIENT)
Dept: GASTROENTEROLOGY | Facility: OTHER | Age: 35
End: 2025-07-15

## 2025-07-16 NOTE — PROGRESS NOTES
_________________________________________________________________________________________________    [address] [address]  [place] [address]  [phone]      Patient:            Arabella Farris   YOB: 1990  Date:                    7/15/2025  Historian:    self  Visit Type:              Office Visit   Rendering Provider:  Bob Almaguer MD    History of Present Illness:    Patient is a 34-year-old female who I am seeing regarding intermittent hematochezia.  She has been having intermittent bleeding for many years.  She has bowel movements every day however sometimes she will feel mild constipation.  She will have a harder stool on occasion.  Rarely she will have pain with bowel movements.  She only has occasional discomfort or pain in the anal canal.  She does have some external irritation or itchiness around the anus.    She will be trying to conceive in about a month or so.    She has had workup in the past for similar symptoms.  In the past she has tried Metamucil which caused loose stools to diarrhea.    June 16, 2020 EGD was normal with biopsies negative for H. pylori and negative for celiac sprue.  Biopsies showed only mild gastritis.  June 11, 2020 colonoscopy for rectal bleeding.  Exam was normal with random colon biopsies normal.  June 26, 2020 CT of the abdomen pelvis showed moderate to large amount of stool suggestive of constipation.  Otherwise normal CT scan.  Labs around that time included CBC, LFTs, lipase, thyroid function, calcium all of which were unremarkable.    Assessment/Plan  # Detail Type Description   1. Assessment Hematochezia (K92.1).   2. Assessment Constipation, unspecified constipation type (K59.00).     Detail Type Description   Impression 1.  Hematochezia.  Intermittent.  Mild constipation.  Previous CT in 2020 did show evidence of constipation.  This has been present for many years.  Previous workup in 2020 did not reveal a cause.  In office exam today showed a  very small skin tag without any pain or discomfort.  No obvious external hemorrhoids at this point.  Most likely bleeding from an anal outlet source such as hemorrhoids.  We discussed repeating a colonoscopy or performing flexible sigmoidoscopy to rule out other causes of bleeding such as large polyp etc.  We discussed using a fiber supplement such as Citrucel daily.  Another option would be to use some dose of MiraLAX daily to help have regular bowel movements.  She will consider what she would like to do in let us know regarding colonoscopy/flexible sigmoidoscopy.    2.  Perianal skin irritation and itchiness.  We discussed using a barrier cream such as Calmoseptine as needed.     Detail Type Description   Provider Plan Consider flexible sigmoidoscopy or colonoscopy to rule out other causes of bleeding aside from hemorrhoid.    May use Citrucel fiber daily or some dose of MiraLAX daily to help have regular nondiarrheal bowel movements.    You may try Calmoseptine for external anal irritation or itching.  This is available at most pharmacies.    Please feel free to contact me with any questions or concerns that you may have.         cc:  Phillip Bradshaw MD  cc:     cc:  Kateryna Carbajal PAC    Electronically Signed by:  Bob Almaguer MD  07/15/2025 09:13 AM      Medications:  Medication Dose Sig Description Comments   albuterol sulfate HFA 90 mcg/actuation aerosol inhaler 90 mcg inhale 1 puff by INHALATION route  every month as needed taking as directed   choline bitartrate UNKNOWN     Fish Oil UNKNOWN take 1 Tablet by Oral route  every day taking as directed   Prenatal 28 mg iron-800 mcg tablet 28 mg iron-800 mcg take 1 tablet by oral route  every day      Allergies:  Medication Name Ingredient Reaction Comment    SUCCINYLCHOLINE       Vitals:  BP mm/Hg Pulse/min Resp/min Temp F Height (Total in.) Weight (lbs.) Weight (oz.) BMI             104/76 67   69.50 135.50  19.72     Smoking Status:    Use Status Type Smoking  Status Usage Per Day Years Used Total Pack Years   no/never  Never smoker        Race:  White  Ethnicity:  Not  or   Preferred Language:  English

## 2025-08-12 ENCOUNTER — OFFICE VISIT (OUTPATIENT)
Dept: FAMILY MEDICINE | Facility: CLINIC | Age: 35
End: 2025-08-12
Payer: COMMERCIAL

## 2025-08-12 VITALS
HEIGHT: 69 IN | SYSTOLIC BLOOD PRESSURE: 118 MMHG | RESPIRATION RATE: 12 BRPM | OXYGEN SATURATION: 100 % | BODY MASS INDEX: 19.99 KG/M2 | TEMPERATURE: 98 F | WEIGHT: 135 LBS | HEART RATE: 69 BPM | DIASTOLIC BLOOD PRESSURE: 79 MMHG

## 2025-08-12 DIAGNOSIS — H61.23 EXCESSIVE CERUMEN IN BOTH EAR CANALS: Primary | ICD-10-CM

## 2025-08-12 PROCEDURE — 1126F AMNT PAIN NOTED NONE PRSNT: CPT | Performed by: INTERNAL MEDICINE

## 2025-08-12 PROCEDURE — 99212 OFFICE O/P EST SF 10 MIN: CPT | Performed by: INTERNAL MEDICINE

## 2025-08-12 PROCEDURE — 3074F SYST BP LT 130 MM HG: CPT | Performed by: INTERNAL MEDICINE

## 2025-08-12 PROCEDURE — 3078F DIAST BP <80 MM HG: CPT | Performed by: INTERNAL MEDICINE

## 2025-08-12 ASSESSMENT — ASTHMA QUESTIONNAIRES
QUESTION_2 LAST FOUR WEEKS HOW OFTEN HAVE YOU HAD SHORTNESS OF BREATH: NOT AT ALL
ACT_TOTALSCORE: 22
QUESTION_3 LAST FOUR WEEKS HOW OFTEN DID YOUR ASTHMA SYMPTOMS (WHEEZING, COUGHING, SHORTNESS OF BREATH, CHEST TIGHTNESS OR PAIN) WAKE YOU UP AT NIGHT OR EARLIER THAN USUAL IN THE MORNING: NOT AT ALL
QUESTION_4 LAST FOUR WEEKS HOW OFTEN HAVE YOU USED YOUR RESCUE INHALER OR NEBULIZER MEDICATION (SUCH AS ALBUTEROL): ONCE A WEEK OR LESS
QUESTION_1 LAST FOUR WEEKS HOW MUCH OF THE TIME DID YOUR ASTHMA KEEP YOU FROM GETTING AS MUCH DONE AT WORK, SCHOOL OR AT HOME: A LITTLE OF THE TIME
QUESTION_5 LAST FOUR WEEKS HOW WOULD YOU RATE YOUR ASTHMA CONTROL: WELL CONTROLLED

## 2025-08-12 ASSESSMENT — PAIN SCALES - GENERAL: PAINLEVEL_OUTOF10: NO PAIN (0)
